# Patient Record
Sex: FEMALE | Race: WHITE | NOT HISPANIC OR LATINO
[De-identification: names, ages, dates, MRNs, and addresses within clinical notes are randomized per-mention and may not be internally consistent; named-entity substitution may affect disease eponyms.]

---

## 2022-07-21 ENCOUNTER — APPOINTMENT (OUTPATIENT)
Dept: UROGYNECOLOGY | Facility: CLINIC | Age: 72
End: 2022-07-21

## 2022-07-21 VITALS — WEIGHT: 184 LBS | BODY MASS INDEX: 33.86 KG/M2 | HEIGHT: 62 IN

## 2022-07-21 DIAGNOSIS — N90.89 OTHER SPECIFIED NONINFLAMMATORY DISORDERS OF VULVA AND PERINEUM: ICD-10-CM

## 2022-07-21 PROCEDURE — 99204 OFFICE O/P NEW MOD 45 MIN: CPT

## 2022-07-21 NOTE — DISCUSSION/SUMMARY
[FreeTextEntry1] : - We reviewed management options for her prolapse and TONE/ISD including: observation, pelvic floor exercises with and without PT, refitting with incontinence pessary, and surgical management. We reviewed various surgical management options including vaginal, laparoscopic/robotic, abdominal procedures. We reviewed procedures involving hysterectomy as well as uterine sparing procedures. We also reviewed both mesh and non-mesh options. Written IUGA information on prolapse treatment options was also provided to her to review. We had an extensive conversation about mesh grafts and r/b. We discussed native tissue repairs. She is unsure how she would like to proceed and will call my office once she has made treatment decision. If pessary, will RTO for refitting with continence pessary. If surgery, she will need pelvic sonogram and scheduling. \par \par -For  her DO, UUI: Continue Myrbetriq 50 mg daily\par \par -For Vulvar dermititis:\par   -Change brand of incontinence pad\par  -apply barrier ointment to skin with each pad change\par  -f/u with GYN if persists

## 2022-07-21 NOTE — PHYSICAL EXAM
[Chaperone Present] : A chaperone was present in the examining room during all aspects of the physical examination [Labia Majora] : were normal [Labia Minora] : were normal [Normal Appearance] : general appearance was normal [Aa ____] : Aa [unfilled] [Ba ____] : Ba [unfilled] [C ____] : C [unfilled] [GH ____] : GH [unfilled] [PB ____] : PB [unfilled] [TVL ____] : TVL  [unfilled] [Ap ____] : Ap [unfilled] [Bp ____] : Bp [unfilled] [D ____] : D [unfilled] [Erythematous] : erythema [Normal] : no abnormalities [Exam Deferred] : was deferred [FreeTextEntry1] : General: Well appearing. Alert and oriented. No acute distress. \par HEENT: Normocephalic, atraumatic, extraocular muscles appear to be intact. \par Neck: Full range of motion, no obvious lymphadenopathy, deformities, or masses noted.\par Respiratory: Speaking in full sentences comfortably. Normal work of breathing. No cough during visit. \par Musculoskeletal: Active full range of motion in extremities. \par Skin: No obvious rash or skin lesions. \par Neuro: Oriented x3. Speech is fluent, normal rate. \par Psych: Normal mood and affect. \par \par  [Tenderness] : ~T no ~M abdominal tenderness observed [Distended] : not distended [de-identified] : pessary removed during visit, unable to appreciate full extent of prolapse due to recent pessary use

## 2022-07-21 NOTE — HISTORY OF PRESENT ILLNESS
[FreeTextEntry1] : Patient is a 72F here for evaluation of pelvic organ prolapse.\par \par She was last seen by urogynecology in 2014 for uterovaginal prolapse, TONE and ISD.  Initially she desired surgical management and underwent workup with pelvic US and urodynamics but ultimately decided she was not ready to have surgery. Since that time she has seen Dr Carl and Dr Joshi. She reports Dr Carl performed workup with URD and discussed surgical options. She was then seen by Dr Joshi who inserted a ring with support pessary and sent her here for surgical consult. Dr Joshi started her on Myrbetriq 50 mg daily which she reports is improving her frequency and urgency. She continues to have bothersome TONE.\par \par Urodynamics 2014: TONE, ISD\par Urodynamics 2022 with trini-she reports DO and UUI, also believes TONE. Will obtain results. \par \par

## 2022-07-21 NOTE — REASON FOR VISIT
[Questionnaire Received] : Patient questionnaire received [Intake Form Reviewed] : Patient intake form with past medical history, surgical history, family history and social history reviewed today [Pelvic Organ Prolapse] : pelvic organ prolapse [Urinary Incontinence] : urinary incontinence [Pessary] : pessary [Spouse] : spouse

## 2022-08-05 DIAGNOSIS — I51.9 HEART DISEASE, UNSPECIFIED: ICD-10-CM

## 2022-08-05 DIAGNOSIS — Z82.5 FAMILY HISTORY OF ASTHMA AND OTHER CHRONIC LOWER RESPIRATORY DISEASES: ICD-10-CM

## 2022-08-05 DIAGNOSIS — Z86.39 PERSONAL HISTORY OF OTHER ENDOCRINE, NUTRITIONAL AND METABOLIC DISEASE: ICD-10-CM

## 2022-08-05 RX ORDER — MIRABEGRON 50 MG/1
50 TABLET, FILM COATED, EXTENDED RELEASE ORAL
Refills: 0 | Status: ACTIVE | COMMUNITY

## 2022-08-12 ENCOUNTER — APPOINTMENT (OUTPATIENT)
Dept: UROGYNECOLOGY | Facility: CLINIC | Age: 72
End: 2022-08-12

## 2022-08-12 PROCEDURE — 99214 OFFICE O/P EST MOD 30 MIN: CPT

## 2022-08-12 PROCEDURE — A4562: CPT

## 2022-08-16 ENCOUNTER — APPOINTMENT (OUTPATIENT)
Dept: ULTRASOUND IMAGING | Facility: CLINIC | Age: 72
End: 2022-08-16

## 2022-08-16 NOTE — DISCUSSION/SUMMARY
[FreeTextEntry1] : \yossi Corbett has uterovaginal prolapse, TONE and ISD. She strongly desires surgery and wants to proceed with surgical scheduling ASAP. Computer generated images were used to demonstrate her prolapse as well as explain treatment options. We reviewed various surgical management options including vaginal, laparoscopic/robotic, abdominal procedures. We reviewed procedures involving hysterectomy as well as uterine sparing procedures. We also reviewed both mesh and non-mesh options. She is interested in RA-HATTIE, [place], sling. I recommend preop pelvic sonogram, Rx provided. She will also require cardiac clearance. Name of procedure, information on surgical time and anesthesia provided to her to give to cardiology. Knob#6 removed for transvaginal sonogram.  She will followup after her pelvic sonogram to replace her pessary.  \par \par She was fitted with an incontinence pessary. Will RTO for pessary insertion after sonogram performed.

## 2022-08-16 NOTE — HISTORY OF PRESENT ILLNESS
[FreeTextEntry1] : Patient is a 72F here for follow up of uterovaginal prolapse and incontinence and for pessary fitting. She currently has a RS #6, but wishes to be fitted with a pessary that will provide support so she will not have TONE. \par \par She was lpreviously seen by urogynecology in 2014 for uterovaginal prolapse, TONE and ISD.  Initially she desired surgical management and underwent workup with pelvic US and urodynamics but ultimately decided she was not ready to have surgery. Since that time she has seen Dr Carl and Dr Joshi. She reports Dr Carl performed workup with URD and discussed surgical options. She was then seen by Dr Joshi who inserted a ring with support pessary and sent her here for surgical consult. Dr Joshi started her on Myrbetriq 50 mg daily which she reports is improving her frequency and urgency. She continues to have bothersome TONE.\par \par Urodynamics 2014: TONE, ISD\par Urodynamics 2022 with trini-she reports DO and UUI, also believes TONE. Will obtain results. \par \par

## 2022-08-16 NOTE — PROCEDURE
[Good Fit] : fits well [Pessary Inserted] : inserted [Pessary Out] : removed [FreeTextEntry1] : Knob #6, stayed in place with ambulation

## 2022-08-16 NOTE — END OF VISIT
[FreeTextEntry3] : I have fully participated in the care of this patient. I have seeen patient,  reviewed all pertinent clinical information, including history, physical exam, plan and the note and I agree.

## 2022-08-18 ENCOUNTER — APPOINTMENT (OUTPATIENT)
Dept: UROGYNECOLOGY | Facility: CLINIC | Age: 72
End: 2022-08-18

## 2022-08-18 PROCEDURE — 99213 OFFICE O/P EST LOW 20 MIN: CPT

## 2022-08-18 NOTE — DISCUSSION/SUMMARY
[FreeTextEntry1] : #Pelvic organ prolapse \par -Continue with Knob #6 \par -Advised of possibility of increased vaginal discharge, possible minimal vaginal bleed \par -Possible pessary falling out \par -Patient will be going away on vacation and will followup upon her return\par -She will f/u as scheduled 10/12/22 for surgical planning\par

## 2022-08-18 NOTE — HISTORY OF PRESENT ILLNESS
[FreeTextEntry1] : Ms. Senior is a 73y/o female with h/o pelvic organ prolapse and TONE who presented last week for IPE with knob #6.  She left without the pessary for a pelvic sonogram scheduled for surgical planning.  She presents today for pessary placement, reports her pelvic sonogram was done yesterday.   \par

## 2022-08-18 NOTE — PROCEDURE
[Good Fit] : fits well [Pessary Inserted] : inserted [None] : no bleeding [Refit] : refit is not needed [Erosion] : no evidence of erosion [Erythema] : no erythema [Discharge] : no vaginal discharge [Infection] : no evidence of infection [FreeTextEntry1] : Knob #6

## 2022-08-19 ENCOUNTER — APPOINTMENT (OUTPATIENT)
Dept: UROGYNECOLOGY | Facility: CLINIC | Age: 72
End: 2022-08-19

## 2022-08-19 PROCEDURE — 99214 OFFICE O/P EST MOD 30 MIN: CPT

## 2022-08-19 PROCEDURE — A4562: CPT

## 2022-08-23 NOTE — DISCUSSION/SUMMARY
[FreeTextEntry1] : Pelvic prolapse:\par -Refitted with Ring with Knob with support #7.\par -Precautions and instructions reviewed\par -Will follow up in 2 weeks or sooner if needed\par \par Advised pt to call office with any questions or concerns

## 2022-08-23 NOTE — PHYSICAL EXAM
[No Acute Distress] : in no acute distress [Well developed] : well developed [Well Nourished] : ~L well nourished [Oriented x3] : oriented to person, place, and time [Normal Memory] : ~T memory was ~L unimpaired [Normal Mood/Affect] : mood and affect are normal [Warm and Dry] : was warm and dry to touch [Normal Gait] : gait was normal [Vulvar Atrophy] : vulvar atrophy [Labia Majora] : were normal [Normal] : was normal [Atrophy] : atrophy [Uterine Prolapse] : uterine prolapse [No Bleeding] : there was no active vaginal bleeding [Good Hygeine] : demonstrates poor hygeine [Anxiety] : patient is not anxious [Tenderness] : ~T no ~M abdominal tenderness observed [Distended] : not distended [de-identified] : B/L erythema due to fungal rash, pt is being treated

## 2022-08-23 NOTE — HISTORY OF PRESENT ILLNESS
[FreeTextEntry1] : Chelle is a 71 y/o that presents to the office for follow up for pelvic prolapse. She was recently fitted with a Ring with knob #6. She reports that it fell out last night while moving her bowels. She denies any vaginal bleeding. She would like to be fitted with a pessary that helps with her urinary leakage.

## 2022-08-23 NOTE — PROCEDURE
[Good Fit] : fits well [Pessary Inserted] : inserted [None] : no bleeding [Medication Review] : Medicaiton Review: Patient verbalizes understanding of risks and benefits [Bowel Management] : Bowel Management: patient verbalizes understanding of daily dietary fiber intake [Refit] : refit is not needed [Erosion] : no evidence of erosion [Erythema] : no erythema [Discharge] : no vaginal discharge [Infection] : no evidence of infection [FreeTextEntry1] : Pt refitted with ing with Knob with support #7 [de-identified] : Pt is able to empty her bladder with no issues. Pt ambulated. Pessary remained in place with bearing down. [FreeTextEntry3] : Replens [FreeTextEntry8] : Discussed [facility]

## 2022-08-25 ENCOUNTER — NON-APPOINTMENT (OUTPATIENT)
Age: 72
End: 2022-08-25

## 2022-08-31 ENCOUNTER — NON-APPOINTMENT (OUTPATIENT)
Age: 72
End: 2022-08-31

## 2022-08-31 ENCOUNTER — APPOINTMENT (OUTPATIENT)
Dept: UROGYNECOLOGY | Facility: CLINIC | Age: 72
End: 2022-08-31

## 2022-08-31 VITALS — TEMPERATURE: 97.1 F | DIASTOLIC BLOOD PRESSURE: 64 MMHG | SYSTOLIC BLOOD PRESSURE: 140 MMHG

## 2022-08-31 DIAGNOSIS — N36.42 INTRINSIC SPHINCTER DEFICIENCY (ISD): ICD-10-CM

## 2022-08-31 DIAGNOSIS — N39.3 STRESS INCONTINENCE (FEMALE) (MALE): ICD-10-CM

## 2022-08-31 DIAGNOSIS — N39.46 MIXED INCONTINENCE: ICD-10-CM

## 2022-08-31 DIAGNOSIS — N36.41 HYPERMOBILITY OF URETHRA: ICD-10-CM

## 2022-08-31 PROCEDURE — 51701 INSERT BLADDER CATHETER: CPT

## 2022-08-31 PROCEDURE — 99214 OFFICE O/P EST MOD 30 MIN: CPT | Mod: 25

## 2022-08-31 NOTE — REASON FOR VISIT
[Follow-Up Visit_____] : a follow-up visit for [unfilled] [Pelvic Organ Prolapse] : pelvic organ prolapse [Urinary Incontinence] : urinary incontinence [Spouse] : spouse

## 2022-09-01 ENCOUNTER — OUTPATIENT (OUTPATIENT)
Dept: OUTPATIENT SERVICES | Facility: HOSPITAL | Age: 72
LOS: 1 days | End: 2022-09-01
Payer: MEDICARE

## 2022-09-01 VITALS
SYSTOLIC BLOOD PRESSURE: 144 MMHG | TEMPERATURE: 98 F | WEIGHT: 164.91 LBS | RESPIRATION RATE: 20 BRPM | HEIGHT: 62 IN | HEART RATE: 89 BPM | OXYGEN SATURATION: 97 % | DIASTOLIC BLOOD PRESSURE: 68 MMHG

## 2022-09-01 DIAGNOSIS — E11.9 TYPE 2 DIABETES MELLITUS WITHOUT COMPLICATIONS: ICD-10-CM

## 2022-09-01 DIAGNOSIS — N81.4 UTEROVAGINAL PROLAPSE, UNSPECIFIED: ICD-10-CM

## 2022-09-01 DIAGNOSIS — I25.10 ATHEROSCLEROTIC HEART DISEASE OF NATIVE CORONARY ARTERY WITHOUT ANGINA PECTORIS: ICD-10-CM

## 2022-09-01 DIAGNOSIS — Z98.49 CATARACT EXTRACTION STATUS, UNSPECIFIED EYE: Chronic | ICD-10-CM

## 2022-09-01 DIAGNOSIS — Z01.818 ENCOUNTER FOR OTHER PREPROCEDURAL EXAMINATION: ICD-10-CM

## 2022-09-01 DIAGNOSIS — Z98.890 OTHER SPECIFIED POSTPROCEDURAL STATES: Chronic | ICD-10-CM

## 2022-09-01 DIAGNOSIS — N36.42 INTRINSIC SPHINCTER DEFICIENCY (ISD): ICD-10-CM

## 2022-09-01 DIAGNOSIS — Z98.61 CORONARY ANGIOPLASTY STATUS: Chronic | ICD-10-CM

## 2022-09-01 DIAGNOSIS — Z29.9 ENCOUNTER FOR PROPHYLACTIC MEASURES, UNSPECIFIED: ICD-10-CM

## 2022-09-01 DIAGNOSIS — N81.2 INCOMPLETE UTEROVAGINAL PROLAPSE: ICD-10-CM

## 2022-09-01 DIAGNOSIS — N39.3 STRESS INCONTINENCE (FEMALE) (MALE): ICD-10-CM

## 2022-09-01 LAB
A1C WITH ESTIMATED AVERAGE GLUCOSE RESULT: 7.1 % — HIGH (ref 4–5.6)
ANION GAP SERPL CALC-SCNC: 12 MMOL/L — SIGNIFICANT CHANGE UP (ref 5–17)
BLD GP AB SCN SERPL QL: NEGATIVE — SIGNIFICANT CHANGE UP
BUN SERPL-MCNC: 20 MG/DL — SIGNIFICANT CHANGE UP (ref 7–23)
CALCIUM SERPL-MCNC: 9.8 MG/DL — SIGNIFICANT CHANGE UP (ref 8.4–10.5)
CHLORIDE SERPL-SCNC: 106 MMOL/L — SIGNIFICANT CHANGE UP (ref 96–108)
CO2 SERPL-SCNC: 21 MMOL/L — LOW (ref 22–31)
CREAT SERPL-MCNC: 0.92 MG/DL — SIGNIFICANT CHANGE UP (ref 0.5–1.3)
EGFR: 66 ML/MIN/1.73M2 — SIGNIFICANT CHANGE UP
ESTIMATED AVERAGE GLUCOSE: 157 MG/DL — HIGH (ref 68–114)
GLUCOSE SERPL-MCNC: 183 MG/DL — HIGH (ref 70–99)
HCT VFR BLD CALC: 37 % — SIGNIFICANT CHANGE UP (ref 34.5–45)
HGB BLD-MCNC: 11.1 G/DL — LOW (ref 11.5–15.5)
MCHC RBC-ENTMCNC: 25.4 PG — LOW (ref 27–34)
MCHC RBC-ENTMCNC: 30 GM/DL — LOW (ref 32–36)
MCV RBC AUTO: 84.7 FL — SIGNIFICANT CHANGE UP (ref 80–100)
NRBC # BLD: 0 /100 WBCS — SIGNIFICANT CHANGE UP (ref 0–0)
PLATELET # BLD AUTO: 221 K/UL — SIGNIFICANT CHANGE UP (ref 150–400)
POTASSIUM SERPL-MCNC: 4.9 MMOL/L — SIGNIFICANT CHANGE UP (ref 3.5–5.3)
POTASSIUM SERPL-SCNC: 4.9 MMOL/L — SIGNIFICANT CHANGE UP (ref 3.5–5.3)
RBC # BLD: 4.37 M/UL — SIGNIFICANT CHANGE UP (ref 3.8–5.2)
RBC # FLD: 16.9 % — HIGH (ref 10.3–14.5)
RH IG SCN BLD-IMP: POSITIVE — SIGNIFICANT CHANGE UP
SODIUM SERPL-SCNC: 139 MMOL/L — SIGNIFICANT CHANGE UP (ref 135–145)
WBC # BLD: 7.12 K/UL — SIGNIFICANT CHANGE UP (ref 3.8–10.5)
WBC # FLD AUTO: 7.12 K/UL — SIGNIFICANT CHANGE UP (ref 3.8–10.5)

## 2022-09-01 PROCEDURE — 83036 HEMOGLOBIN GLYCOSYLATED A1C: CPT

## 2022-09-01 PROCEDURE — 86850 RBC ANTIBODY SCREEN: CPT

## 2022-09-01 PROCEDURE — 36415 COLL VENOUS BLD VENIPUNCTURE: CPT

## 2022-09-01 PROCEDURE — 86900 BLOOD TYPING SEROLOGIC ABO: CPT

## 2022-09-01 PROCEDURE — 80048 BASIC METABOLIC PNL TOTAL CA: CPT

## 2022-09-01 PROCEDURE — G0463: CPT

## 2022-09-01 PROCEDURE — 86901 BLOOD TYPING SEROLOGIC RH(D): CPT

## 2022-09-01 PROCEDURE — 85027 COMPLETE CBC AUTOMATED: CPT

## 2022-09-01 NOTE — H&P PST ADULT - NSICDXPASTSURGICALHX_GEN_ALL_CORE_FT
PAST SURGICAL HISTORY:  H/O cataract extraction     H/O colonoscopy     History of arthroplasty of left hip for osteoarthritis     PAST SURGICAL HISTORY:  H/O breast biopsy 2002   no marker    H/O cataract extraction     H/O colonoscopy     History of arthroplasty of left hip for osteoarthritis  1/2022    S/P PTCA (percutaneous transluminal coronary angioplasty) 2016  coronary artery stenting

## 2022-09-01 NOTE — PHYSICAL EXAM
[Labia Majora] : were normal [Labia Minora] : were normal [Normal Appearance] : general appearance was normal [Atrophy] : atrophy [Exam Deferred] : was deferred [Normal] : normal [FreeTextEntry1] : General: Well, appearing. Alert and orientated. No acute distress\par HEENT: Normocephalic, atraumatic and extraocular muscles appear to be intact \par Neck: Full range of motion, no obvious lymphadenopathy, deformities, or masses noted \par Respiratory: Speaking in full sentences comfortably, normal work of breathing and no cough during visit\par Musculoskeletal: active full range of motion in extremities \par Extremities: No upper extremity edema noted\par Skin: no obvious rash or skin lesions\par Neuro: Orientated X 3, speech is fluent, normal rate\par Psych: Normal mood and affect\par  [de-identified] : unable to fully evaluate due to pessary use

## 2022-09-01 NOTE — H&P PST ADULT - HISTORY OF PRESENT ILLNESS
72 year old female with h/o CAD s/p PTCA with coronary artery stenting (2016, on xarelto and aspirin), HTN, dyslipidemia, T2DM, osteoarthritis s/p left THR (1/2022), presents for preop testing for scheduled laparoscopic robotic supracervical hysterectomy/ laparoscopic robotic sacral colpopexy/ midurethral sling/ cystoscopy for uterovaginal prolapse/ intrinsic sphincter deficiency on 9/22/2022. Pt denies any recent urinary infection, urine sample collected at surgeon's office on 8/31/2022 for culture.  Pt was evaluated by her cardiologist and advised to hold Xarelto for 5 days prior to surgery, her last dose on 9/16/2022 evening, will continue her aspirin.   Pt denies any symptoms of covid-19 and scheduled for PCR test on 9/19/2022 at [facility]. 72 year old female with h/o CAD s/p PTCA with coronary artery stenting (2016, on xarelto and aspirin), HTN, dyslipidemia, T2DM, osteoarthritis s/p left THR (1/2022), presents for preop testing for scheduled laparoscopic robotic supracervical hysterectomy/ laparoscopic robotic sacral colpopexy/ midurethral sling/ cystoscopy for uterovaginal prolapse/ intrinsic sphincter deficiency on 9/22/2022. Pt denies any recent urinary infection, urine sample collected at surgeon's office on 8/31/2022 for culture.  Pt was evaluated by her cardiologist and advised to hold Xarelto for 5 days prior to surgery, her last dose on 9/16/2022 evening, will continue her aspirin.   Pt denies any symptoms of covid-19 and scheduled for PCR test on 9/19/2022 at [facility].    Addendum: 9/20/22 1105am Pt placed on oral Ceftin to treat a current UTI.  72 year old female with h/o CAD s/p PTCA with coronary artery stenting (2016, on xarelto and aspirin), HTN, dyslipidemia, T2DM, osteoarthritis s/p left THR (1/2022), presents for preop testing for scheduled laparoscopic robotic supracervical hysterectomy/ laparoscopic robotic sacral colpopexy/ midurethral sling/ cystoscopy for uterovaginal prolapse/ intrinsic sphincter deficiency on 9/22/2022. Pt denies any recent urinary infection, urine sample collected at surgeon's office on 8/31/2022 for culture.  Pt was evaluated by her cardiologist and advised to hold Xarelto for 5 days prior to surgery, her last dose on 9/16/2022 evening, will continue her aspirin.   Pt denies any symptoms of covid-19 and scheduled for PCR test on 9/19/2022 at [facility].    Addendum: 9/20/22 1105am Pt placed on oral Ceftin to treat a current UTI, as per PCP.

## 2022-09-01 NOTE — HISTORY OF PRESENT ILLNESS
[FreeTextEntry1] : Chelle is a 71 yo who presents, with her  today, for follow up on prolapse and urinary incontinence. She has a hx of CAD and prior stent placement and is on Xarelto 2.5mg BID for this. \par \par Pelvic U/S 8/16/2022: uterus: 6.3 cm x 3.8 cm x 5.5 cm. multiple fibroids largest measuring 2.4 cm x 1.9cm intramural fibroid. EMS 4mm. R ovary: 2.5 cm x 1.4 cm. L ovary: 2.4 cm x 1.9cm. \par Pap 09/2021: negative cotesting \par UDS 08/2022: +-369mL. +DO (but not sure what volume) PVR 0mL. \par Cardiology clearance obtained: pt to stop Xarelto 5 days prior to surgery and to continue ASA 81mg. \par Medical clearance scheduled. \par \par PMHx: CAD, DM (A1c: 6.6% in July, per pt) \par PSHx: cardiac stent placement/catheter ablation\par \par \par

## 2022-09-01 NOTE — H&P PST ADULT - ASSESSMENT
CAPRINI SCORE [CLOT updated 18]    AGE RELATED RISK FACTORS                                                       MOBILITY RELATED FACTORS  [ ] Age 41-60 years                                            (1 Point)                    [ ] Bed rest                                                        (1 Point)  [2 ] Age: 61-74 years                                           (2 Points)                  [ ] Plaster cast                                                   (2 Points)  [ ] Age= 75 years                                              (3 Points)                    [ ] Bed bound for more than 72 hours                 (2 Points)    DISEASE RELATED RISK FACTORS                                               GENDER SPECIFIC FACTORS  [ ] Edema in the lower extremities                       (1 Point)              [ ] Pregnancy                                                     (1 Point)  [ ] Varicose veins                                               (1 Point)                     [ ] Post-partum < 6 weeks                                   (1 Point)             [ 1] BMI > 25 Kg/m2                                            (1 Point)                     [ ] Hormonal therapy  or oral contraception          (1 Point)                 [ ] Sepsis (in the previous month)                        (1 Point)               [ ] History of pregnancy complications                 (1 point)  [ ] Pneumonia or serious lung disease                                               [ ] Unexplained or recurrent                     (1 Point)           (in the previous month)                               (1 Point)  [ ] Abnormal pulmonary function test                     (1 Point)                 SURGERY RELATED RISK FACTORS  [ ] Acute myocardial infarction                              (1 Point)               [ ]  Section                                             (1 Point)  [ ] Congestive heart failure (in the previous month)  (1 Point)      [ ] Minor surgery                                                  (1 Point)   [ ] Inflammatory bowel disease                             (1 Point)               [ ] Arthroscopic surgery                                        (2 Points)  [ ] Central venous access                                      (2 Points)                [2 ] General surgery lasting more than 45 minutes (2 points)  [ ] Present or previous malignancy                     (2 Points)                [ ] Elective arthroplasty                                         (5 points)    [ ] Stroke (in the previous month)                          (5 Points)                                                                                                                                                           HEMATOLOGY RELATED FACTORS                                                 TRAUMA RELATED RISK FACTORS  [ ] Prior episodes of VTE                                     (3 Points)                [ ] Fracture of the hip, pelvis, or leg                       (5 Points)  [ ] Positive family history for VTE                         (3 Points)             [ ] Acute spinal cord injury (in the previous month)  (5 Points)  [ ] Prothrombin 13206 A                                     (3 Points)               [ ] Paralysis  (less than 1 month)                             (5 Points)  [ ] Factor V Leiden                                             (3 Points)                  [ ] Multiple Trauma within 1 month                        (5 Points)  [ ] Lupus anticoagulants                                     (3 Points)                                                           [ ] Anticardiolipin antibodies                               (3 Points)                                                       [ ] High homocysteine in the blood                      (3 Points)                                             [ ] Other congenital or acquired thrombophilia      (3 Points)                                                [ ] Heparin induced thrombocytopenia                  (3 Points)                                     Total Score [5]

## 2022-09-01 NOTE — H&P PST ADULT - PROBLEM SELECTOR PLAN 1
Scheduled for laparoscopic robotic supracervical hysterectomy/ laparoscopic robotic sacral colopexy/ midurethral sling/ cystoscopy  both pt and her  provided with verbal and written preop instruction, pt verbalized understand and teach back     urine sample collected for culture at Dr. Valentine's office on 8/31/2022, result pending

## 2022-09-01 NOTE — H&P PST ADULT - OTHER CARE PROVIDERS
Cardiologist: Dr. James Victoria # [phone]           Endocrinologist: Dr. Andreia Durán # [phone]

## 2022-09-01 NOTE — H&P PST ADULT - NSICDXPASTMEDICALHX_GEN_ALL_CORE_FT
PAST MEDICAL HISTORY:  CAD (coronary artery disease)     HTN (hypertension)     T2DM (type 2 diabetes mellitus) last A1c 6.6     PAST MEDICAL HISTORY:  CAD (coronary artery disease)     History of osteoarthritis     HTN (hypertension)     Incomplete uterovaginal prolapse     T2DM (type 2 diabetes mellitus) last A1c 6.6     PAST MEDICAL HISTORY:  CAD (coronary artery disease)     History of osteoarthritis     HTN (hypertension)     Incomplete uterovaginal prolapse     Right Achilles tendinitis     T2DM (type 2 diabetes mellitus) last A1c 6.6

## 2022-09-01 NOTE — H&P PST ADULT - PROBLEM SELECTOR PLAN 2
pt to hold xarelto for 5 days prior to surgery per  her cardiologist, last dose on 9/16/2022 night   cardiologist evaluation, echo and ekg to obtain

## 2022-09-01 NOTE — H&P PST ADULT - NS MD HP INPLANTS MED DEV
left artificial hip joint, coronary artery sten left artificial hip joint, coronary artery stent and toot implant

## 2022-09-01 NOTE — PROCEDURE
Received records from previous provider.    Past medical history significant for hidradenitis suppurativa, HPV in 2014 with negative follow-up Pap in 2015, keloid of skin. Past surgical history significant for swelling gland excision of the axilla in 2010. Family history significant for diabetes mellitus. Allergy to shellfish noted.    BMP dated 7/27/17 notable for glucose of 150. Cholesterol panel dated 8/14/17 notable for total cholesterol of 200. HDL of 37. LDL of 134.     Hemoglobin A1c dated 8/14/17 was 8.4.     At 8/22/17 visit, discussion regarding diet modification for recently diagnosed diabetes was held. Patient was started on metformin 500 mg b.i.d. with meals and recommended to check blood glucose daily.    Cuauhtemoc Moreno DO     [Straight Catheterization] : insertion of a straight catheter [Urinary Tract Infection] : a urinary tract infection [Patient] : the patient [Consent Obtained] : written consent was obtained prior to the procedure and is detailed in the patient's record [___ Fr Straight Tip] : a [unfilled] in [place] straight tip catheter [None] : there were no complications with the catheter insertion [Clear] : clear [Culture] : culture [No Complications] : no complications [Tolerated Well] : the patient tolerated the procedure well [Good Fit] : fits well [Pessary Inserted] : inserted [Pessary Washed] : washed [Refit] : refit is not needed [Erosion] : no evidence of erosion [Erythema] : no erythema [Discharge] : no vaginal discharge [Infection] : no evidence of infection [FreeTextEntry1] : Ring with knob #6.  [FreeTextEntry8] : Pessary precautions and instructions discussed. Discussed removal 1 week prior to surgery on 9/15/2022.

## 2022-09-01 NOTE — DISCUSSION/SUMMARY
[FreeTextEntry1] : Chelle is a 71 yo who presents with her  today for follow up on prolapse and urinary incontinence. We reviewed management options for her prolapse including: observation, pelvic floor exercises, pessary, surgical management. We reviewed various surgical management options including vaginal, laparoscopic/robotic, abdominal procedures. We also reviewed both mesh and non-mesh options. She continues to be interested in robotic supracervical hysterectomy with sacral colpopexy. We discussed her URD findings of TONE and reviewed treatment options. She desires a midurethral sling at the time of her prolapse repair. \par \par She denies a history of abnormal Pap smears in the past and a recent Pap smear is negative. Risks and benefits of total hysterectomy versus supracervical hysterectomy were discussed with patient and she opted for supracervical hysterectomy. She is aware that her cervix will be left in situ and she will require continued routine screening. We discussed the risks and benefits of removing her ovaries at the time of surgery and she would like to keep her ovaries in situ. she would like to proceed with a bilateral salpingectomy at the time of the procedure. we reviewed risks of the surgery including but not limited to: bleeding, infection, pain, urinary retention, persistence or worsening of stress urinary incontinence, development of urge incontinence, voiding dysfunction, mesh erosion, failure to reduce prolapse, prolapse recurrence, and dyspareunia. we discussed the risk of laparotomy (having to make an incision). We discussed the risk of injury to her bladder, ureters, urethra, vagina, rectum and bowel. We discussed the risk of mesh erosion, fistula formation and neuropathy. The risks and procedure details were explained in layman's terms and she expressed understanding of all of these risks. She understands that mesh will be used and we reviewed the FDA notification on transvaginal mesh. A separate mesh specific consent form with procedure risks and mesh risks was reviewed and initialed by myself and the patient. This was witnessed and signed. \par \par We discussed the elective nature of the surgery and we discussed that she is choosing to undergo this surgery despite awareness and understanding of procedure risks. She was counseled on alternative treatment options. She continues to desire procedure. We reviewed her hospital stay as well as preoperative and postoperative instructions.\par \par Patient signed consent for: pelvic exam under anesthesia, robotic/laparoscopic-assisted supracervical hysterectomy, bilateral salpingectomy, sacrocolpopexy, midurethral sling, cystoscopy, possible laparotomy/oophorectomy/anterior repair/posterior repair. \par \par Pt signed consent for clinical effectiveness of pre-incision versus post-incision local anesthetic during laparoscopic/robotic sacrocolpopexy randomized clinical trial. Risks and benefits of the study of enrolling were discussed and patient elected to proceed with the study. Informational pamphlet was provided to the patient and consent was signed in the office. She was given a copy of the signed consent form along with the narcotic diary and brief pain inventory questionnaire. She was informed that being apart of the research study is completely elective and she can withdraw from the study at any time. All questions answered.\par \par \par Patient will return on 9/15/2022 for pessary removal 1 week prior to surgery. \par

## 2022-09-01 NOTE — H&P PST ADULT - PROBLEM SELECTOR PLAN 3
fs on admit   pt to hold Janumet x 24 hours prior to surgery, last dose on 921/2022 morning  no diabetic medication on DOS

## 2022-09-01 NOTE — H&P PST ADULT - BMI (KG/M2)
Physical therapy orders received, chart reviewed. Patient scheduled for hip ORIF later this date. Will hold evaluation and await new orders following surgery.   30.2

## 2022-09-01 NOTE — H&P PST ADULT - PAIN DESCRIPTION (FREQUENCY/QUALITY), PROFILE
Kriss from Lab called with critical result of WBC 54 at 2125. Critical lab result read back to Kriss.   Dr. Mahajan notified of critical lab result at 2130.  Critical lab result read back by Dr. Mahajan.   intermittent

## 2022-09-07 PROBLEM — E11.9 TYPE 2 DIABETES MELLITUS WITHOUT COMPLICATIONS: Chronic | Status: ACTIVE | Noted: 2022-09-01

## 2022-09-07 PROBLEM — M76.61 ACHILLES TENDINITIS, RIGHT LEG: Chronic | Status: ACTIVE | Noted: 2022-09-01

## 2022-09-07 PROBLEM — N81.2 INCOMPLETE UTEROVAGINAL PROLAPSE: Chronic | Status: ACTIVE | Noted: 2022-09-01

## 2022-09-07 PROBLEM — Z87.39 PERSONAL HISTORY OF OTHER DISEASES OF THE MUSCULOSKELETAL SYSTEM AND CONNECTIVE TISSUE: Chronic | Status: ACTIVE | Noted: 2022-09-01

## 2022-09-07 PROBLEM — I10 ESSENTIAL (PRIMARY) HYPERTENSION: Chronic | Status: ACTIVE | Noted: 2022-09-01

## 2022-09-07 PROBLEM — I25.10 ATHEROSCLEROTIC HEART DISEASE OF NATIVE CORONARY ARTERY WITHOUT ANGINA PECTORIS: Chronic | Status: ACTIVE | Noted: 2022-09-01

## 2022-09-08 ENCOUNTER — APPOINTMENT (OUTPATIENT)
Dept: UROGYNECOLOGY | Facility: CLINIC | Age: 72
End: 2022-09-08

## 2022-09-08 PROCEDURE — 99213 OFFICE O/P EST LOW 20 MIN: CPT

## 2022-09-08 NOTE — HISTORY OF PRESENT ILLNESS
[FreeTextEntry1] : Chelle is a 73 yo who presents, with her  today, for follow up on prolapse. Prolapse is supported with Ring with knob #7. She denies any vaginal bleeding, pelvic pain or pressure. She reports occasional urinary leakage. She is having surgery with Dr. Valentine on 9/22. She is having pessary removed on 9/15.\par \par

## 2022-09-08 NOTE — PHYSICAL EXAM
[FreeTextEntry1] : General: Well, appearing. Alert and orientated. No acute distress\par HEENT: Normocephalic, atraumatic and extraocular muscles appear to be intact \par Neck: Full range of motion, no obvious lymphadenopathy, deformities, or masses noted \par Respiratory: Speaking in full sentences comfortably, normal work of breathing and no cough during visit\par Musculoskeletal: active full range of motion in extremities \par Extremities: No upper extremity edema noted\par Skin: no obvious rash or skin lesions\par Neuro: Orientated X 3, speech is fluent, normal rate\par Psych: Normal mood and affect\par  [No Acute Distress] : in no acute distress [Well developed] : well developed [Well Nourished] : ~L well nourished [Good Hygeine] : demonstrates good hygeine [Oriented x3] : oriented to person, place, and time [Normal Memory] : ~T memory was ~L unimpaired [Normal Mood/Affect] : mood and affect are normal [Anxiety] : patient is not anxious [Tenderness] : ~T no ~M abdominal tenderness observed [Distended] : not distended [Warm and Dry] : was warm and dry to touch [Normal Gait] : gait was normal [Vulvar Atrophy] : vulvar atrophy [Labia Majora] : were normal [Labia Minora] : were normal [Normal] : was normal [Normal Appearance] : general appearance was normal [Atrophy] : atrophy

## 2022-09-08 NOTE — REASON FOR VISIT
[Follow-Up Visit_____] : a follow-up visit for [unfilled] [Spouse] : spouse [Pelvic Organ Prolapse] : pelvic organ prolapse [Urinary Incontinence] : urinary incontinence

## 2022-09-08 NOTE — DISCUSSION/SUMMARY
[FreeTextEntry1] : Pelvic prolapse:\par Continue with Ring with knob #7\par Reviewed pessary precautions and instructions\par Will RTO 9/15 to remove pessary prior to surgery on 9/22\par \par Advised pt to call the office with any questions or concerns.

## 2022-09-08 NOTE — PROCEDURE
[Good Fit] : fits well [Refit] : refit is not needed [Erosion] : no evidence of erosion [Erythema] : no erythema [Discharge] : no vaginal discharge [Infection] : no evidence of infection [Pessary Inserted] : inserted [Pessary Washed] : washed [None] : no bleeding [FreeTextEntry1] : Ring with knob #7

## 2022-09-15 ENCOUNTER — APPOINTMENT (OUTPATIENT)
Dept: UROGYNECOLOGY | Facility: CLINIC | Age: 72
End: 2022-09-15

## 2022-09-15 VITALS — DIASTOLIC BLOOD PRESSURE: 60 MMHG | TEMPERATURE: 97.3 F | SYSTOLIC BLOOD PRESSURE: 120 MMHG

## 2022-09-15 DIAGNOSIS — N81.4 UTEROVAGINAL PROLAPSE, UNSPECIFIED: ICD-10-CM

## 2022-09-15 PROCEDURE — 99212 OFFICE O/P EST SF 10 MIN: CPT

## 2022-09-16 ENCOUNTER — NON-APPOINTMENT (OUTPATIENT)
Age: 72
End: 2022-09-16

## 2022-09-19 ENCOUNTER — NON-APPOINTMENT (OUTPATIENT)
Age: 72
End: 2022-09-19

## 2022-09-19 ENCOUNTER — OUTPATIENT (OUTPATIENT)
Dept: OUTPATIENT SERVICES | Facility: HOSPITAL | Age: 72
LOS: 1 days | End: 2022-09-19

## 2022-09-19 DIAGNOSIS — Z11.52 ENCOUNTER FOR SCREENING FOR COVID-19: ICD-10-CM

## 2022-09-19 DIAGNOSIS — Z98.890 OTHER SPECIFIED POSTPROCEDURAL STATES: Chronic | ICD-10-CM

## 2022-09-19 DIAGNOSIS — Z98.49 CATARACT EXTRACTION STATUS, UNSPECIFIED EYE: Chronic | ICD-10-CM

## 2022-09-19 DIAGNOSIS — Z98.61 CORONARY ANGIOPLASTY STATUS: Chronic | ICD-10-CM

## 2022-09-19 LAB — SARS-COV-2 RNA SPEC QL NAA+PROBE: SIGNIFICANT CHANGE UP

## 2022-09-21 ENCOUNTER — TRANSCRIPTION ENCOUNTER (OUTPATIENT)
Age: 72
End: 2022-09-21

## 2022-09-22 ENCOUNTER — RESULT REVIEW (OUTPATIENT)
Age: 72
End: 2022-09-22

## 2022-09-22 ENCOUNTER — TRANSCRIPTION ENCOUNTER (OUTPATIENT)
Age: 72
End: 2022-09-22

## 2022-09-22 ENCOUNTER — APPOINTMENT (OUTPATIENT)
Dept: UROGYNECOLOGY | Facility: HOSPITAL | Age: 72
End: 2022-09-22

## 2022-09-22 ENCOUNTER — INPATIENT (INPATIENT)
Facility: HOSPITAL | Age: 72
LOS: 0 days | Discharge: HOME | DRG: 743 | End: 2022-09-23
Attending: STUDENT IN AN ORGANIZED HEALTH CARE EDUCATION/TRAINING PROGRAM | Admitting: STUDENT IN AN ORGANIZED HEALTH CARE EDUCATION/TRAINING PROGRAM
Payer: COMMERCIAL

## 2022-09-22 VITALS
OXYGEN SATURATION: 99 % | DIASTOLIC BLOOD PRESSURE: 75 MMHG | SYSTOLIC BLOOD PRESSURE: 136 MMHG | TEMPERATURE: 98 F | RESPIRATION RATE: 20 BRPM | HEART RATE: 76 BPM | WEIGHT: 164.91 LBS | HEIGHT: 62 IN

## 2022-09-22 DIAGNOSIS — Z98.890 OTHER SPECIFIED POSTPROCEDURAL STATES: Chronic | ICD-10-CM

## 2022-09-22 DIAGNOSIS — Z98.61 CORONARY ANGIOPLASTY STATUS: Chronic | ICD-10-CM

## 2022-09-22 DIAGNOSIS — N39.3 STRESS INCONTINENCE (FEMALE) (MALE): ICD-10-CM

## 2022-09-22 DIAGNOSIS — Z98.49 CATARACT EXTRACTION STATUS, UNSPECIFIED EYE: Chronic | ICD-10-CM

## 2022-09-22 LAB
GLUCOSE BLDC GLUCOMTR-MCNC: 220 MG/DL — HIGH (ref 70–99)
GLUCOSE BLDC GLUCOMTR-MCNC: 220 MG/DL — HIGH (ref 70–99)
GLUCOSE BLDC GLUCOMTR-MCNC: 248 MG/DL — HIGH (ref 70–99)
GLUCOSE BLDC GLUCOMTR-MCNC: 258 MG/DL — HIGH (ref 70–99)
HCT VFR BLD CALC: 34.4 % — LOW (ref 34.5–45)
HGB BLD-MCNC: 10.9 G/DL — LOW (ref 11.5–15.5)

## 2022-09-22 PROCEDURE — 88305 TISSUE EXAM BY PATHOLOGIST: CPT | Mod: 26

## 2022-09-22 PROCEDURE — 57425 LAPAROSCOPY SURG COLPOPEXY: CPT

## 2022-09-22 PROCEDURE — 58542 LSH W/T/O UT 250 G OR LESS: CPT

## 2022-09-22 PROCEDURE — 57288 REPAIR BLADDER DEFECT: CPT

## 2022-09-22 DEVICE — SURGIFLO MATRIX WITH THROMBIN KIT
Type: IMPLANTABLE DEVICE | Status: NON-FUNCTIONAL
Removed: 2022-09-22

## 2022-09-22 DEVICE — SLING TRANSVAGINAL MID-URETHRAL ADVANTAGE FIT BLUE
Type: IMPLANTABLE DEVICE | Status: NON-FUNCTIONAL
Removed: 2022-09-22

## 2022-09-22 DEVICE — MESH UPSYLON Y
Type: IMPLANTABLE DEVICE | Status: NON-FUNCTIONAL
Removed: 2022-09-22

## 2022-09-22 RX ORDER — CELECOXIB 200 MG/1
400 CAPSULE ORAL ONCE
Refills: 0 | Status: COMPLETED | OUTPATIENT
Start: 2022-09-22 | End: 2022-09-22

## 2022-09-22 RX ORDER — ACETAMINOPHEN 500 MG
1000 TABLET ORAL EVERY 6 HOURS
Refills: 0 | Status: ACTIVE | OUTPATIENT
Start: 2022-09-22 | End: 2023-08-21

## 2022-09-22 RX ORDER — CHLORHEXIDINE GLUCONATE 213 G/1000ML
1 SOLUTION TOPICAL ONCE
Refills: 0 | Status: DISCONTINUED | OUTPATIENT
Start: 2022-09-22 | End: 2022-09-22

## 2022-09-22 RX ORDER — DEXTROSE 50 % IN WATER 50 %
25 SYRINGE (ML) INTRAVENOUS ONCE
Refills: 0 | Status: ACTIVE | OUTPATIENT
Start: 2022-09-22

## 2022-09-22 RX ORDER — LOSARTAN POTASSIUM 100 MG/1
1 TABLET, FILM COATED ORAL
Qty: 0 | Refills: 0 | DISCHARGE

## 2022-09-22 RX ORDER — SODIUM CHLORIDE 9 MG/ML
1000 INJECTION, SOLUTION INTRAVENOUS
Refills: 0 | Status: ACTIVE | OUTPATIENT
Start: 2022-09-22 | End: 2023-08-21

## 2022-09-22 RX ORDER — SODIUM CHLORIDE 9 MG/ML
1000 INJECTION, SOLUTION INTRAVENOUS
Refills: 0 | Status: DISCONTINUED | OUTPATIENT
Start: 2022-09-22 | End: 2022-09-23

## 2022-09-22 RX ORDER — CARVEDILOL PHOSPHATE 80 MG/1
1 CAPSULE, EXTENDED RELEASE ORAL
Qty: 0 | Refills: 0 | DISCHARGE

## 2022-09-22 RX ORDER — OXYCODONE HYDROCHLORIDE 5 MG/1
5 TABLET ORAL EVERY 4 HOURS
Refills: 0 | Status: ACTIVE | OUTPATIENT
Start: 2022-09-22 | End: 2022-09-29

## 2022-09-22 RX ORDER — DEXTROSE 50 % IN WATER 50 %
15 SYRINGE (ML) INTRAVENOUS ONCE
Refills: 0 | Status: ACTIVE | OUTPATIENT
Start: 2022-09-22 | End: 2023-08-21

## 2022-09-22 RX ORDER — POLYETHYLENE GLYCOL 3350 17 G/17G
17 POWDER, FOR SOLUTION ORAL AT BEDTIME
Refills: 0 | Status: ACTIVE | OUTPATIENT
Start: 2022-09-22 | End: 2023-08-21

## 2022-09-22 RX ORDER — TOBRAMYCIN 0.3 %
1 DROPS OPHTHALMIC (EYE) EVERY 4 HOURS
Refills: 0 | Status: COMPLETED | OUTPATIENT
Start: 2022-09-22 | End: 2022-09-23

## 2022-09-22 RX ORDER — HYDROMORPHONE HYDROCHLORIDE 2 MG/ML
0.25 INJECTION INTRAMUSCULAR; INTRAVENOUS; SUBCUTANEOUS
Refills: 0 | Status: DISCONTINUED | OUTPATIENT
Start: 2022-09-22 | End: 2022-09-22

## 2022-09-22 RX ORDER — ENOXAPARIN SODIUM 100 MG/ML
40 INJECTION SUBCUTANEOUS ONCE
Refills: 0 | Status: COMPLETED | OUTPATIENT
Start: 2022-09-22 | End: 2022-09-22

## 2022-09-22 RX ORDER — ASPIRIN/CALCIUM CARB/MAGNESIUM 324 MG
0 TABLET ORAL
Qty: 0 | Refills: 0 | DISCHARGE

## 2022-09-22 RX ORDER — ATORVASTATIN CALCIUM 80 MG/1
1 TABLET, FILM COATED ORAL
Qty: 0 | Refills: 0 | DISCHARGE

## 2022-09-22 RX ORDER — SITAGLIPTIN AND METFORMIN HYDROCHLORIDE 500; 50 MG/1; MG/1
1 TABLET, FILM COATED ORAL
Qty: 0 | Refills: 0 | DISCHARGE

## 2022-09-22 RX ORDER — SIMETHICONE 80 MG/1
80 TABLET, CHEWABLE ORAL EVERY 6 HOURS
Refills: 0 | Status: ACTIVE | OUTPATIENT
Start: 2022-09-22 | End: 2023-08-21

## 2022-09-22 RX ORDER — LIDOCAINE HCL 20 MG/ML
0.2 VIAL (ML) INJECTION ONCE
Refills: 0 | Status: DISCONTINUED | OUTPATIENT
Start: 2022-09-22 | End: 2022-09-22

## 2022-09-22 RX ORDER — DEXTROSE 50 % IN WATER 50 %
12.5 SYRINGE (ML) INTRAVENOUS ONCE
Refills: 0 | Status: ACTIVE | OUTPATIENT
Start: 2022-09-22

## 2022-09-22 RX ORDER — GABAPENTIN 400 MG/1
300 CAPSULE ORAL EVERY 8 HOURS
Refills: 0 | Status: ACTIVE | OUTPATIENT
Start: 2022-09-22 | End: 2022-09-25

## 2022-09-22 RX ORDER — ONDANSETRON 8 MG/1
8 TABLET, FILM COATED ORAL EVERY 8 HOURS
Refills: 0 | Status: COMPLETED | OUTPATIENT
Start: 2022-09-22 | End: 2022-09-23

## 2022-09-22 RX ORDER — OXYCODONE HYDROCHLORIDE 5 MG/1
1 TABLET ORAL
Qty: 5 | Refills: 0
Start: 2022-09-22 | End: 2022-09-23

## 2022-09-22 RX ORDER — INSULIN LISPRO 100/ML
VIAL (ML) SUBCUTANEOUS AT BEDTIME
Refills: 0 | Status: ACTIVE | OUTPATIENT
Start: 2022-09-22 | End: 2023-08-21

## 2022-09-22 RX ORDER — MIRABEGRON 50 MG/1
1 TABLET, EXTENDED RELEASE ORAL
Qty: 0 | Refills: 0 | DISCHARGE

## 2022-09-22 RX ORDER — GENTAMICIN SULFATE 40 MG/ML
370 VIAL (ML) INJECTION ONCE
Refills: 0 | Status: ACTIVE | OUTPATIENT
Start: 2022-09-22 | End: 2022-09-22

## 2022-09-22 RX ORDER — GLIMEPIRIDE 1 MG
1 TABLET ORAL
Qty: 0 | Refills: 0 | DISCHARGE

## 2022-09-22 RX ORDER — GABAPENTIN 400 MG/1
600 CAPSULE ORAL ONCE
Refills: 0 | Status: COMPLETED | OUTPATIENT
Start: 2022-09-22 | End: 2022-09-22

## 2022-09-22 RX ORDER — SODIUM CHLORIDE 9 MG/ML
3 INJECTION INTRAMUSCULAR; INTRAVENOUS; SUBCUTANEOUS EVERY 8 HOURS
Refills: 0 | Status: DISCONTINUED | OUTPATIENT
Start: 2022-09-22 | End: 2022-09-22

## 2022-09-22 RX ORDER — ACETAMINOPHEN 500 MG
1000 TABLET ORAL ONCE
Refills: 0 | Status: COMPLETED | OUTPATIENT
Start: 2022-09-22 | End: 2022-09-22

## 2022-09-22 RX ORDER — RIVAROXABAN 15 MG-20MG
1 KIT ORAL
Qty: 0 | Refills: 0 | DISCHARGE

## 2022-09-22 RX ORDER — TOBRAMYCIN 0.3 %
DROPS OPHTHALMIC (EYE)
Refills: 0 | Status: COMPLETED | OUTPATIENT
Start: 2022-09-22 | End: 2022-09-23

## 2022-09-22 RX ORDER — METRONIDAZOLE 500 MG
500 TABLET ORAL ONCE
Refills: 0 | Status: ACTIVE | OUTPATIENT
Start: 2022-09-22 | End: 2022-09-22

## 2022-09-22 RX ORDER — KETOROLAC TROMETHAMINE 30 MG/ML
15 SYRINGE (ML) INJECTION EVERY 8 HOURS
Refills: 0 | Status: DISCONTINUED | OUTPATIENT
Start: 2022-09-22 | End: 2022-09-23

## 2022-09-22 RX ORDER — METOPROLOL TARTRATE 50 MG
5 TABLET ORAL EVERY 6 HOURS
Refills: 0 | Status: ACTIVE | OUTPATIENT
Start: 2022-09-22 | End: 2023-08-21

## 2022-09-22 RX ORDER — HYDROMORPHONE HYDROCHLORIDE 2 MG/ML
0.5 INJECTION INTRAMUSCULAR; INTRAVENOUS; SUBCUTANEOUS
Refills: 0 | Status: DISCONTINUED | OUTPATIENT
Start: 2022-09-22 | End: 2022-09-22

## 2022-09-22 RX ORDER — TOBRAMYCIN 0.3 %
1 DROPS OPHTHALMIC (EYE) ONCE
Refills: 0 | Status: COMPLETED | OUTPATIENT
Start: 2022-09-22 | End: 2022-09-22

## 2022-09-22 RX ORDER — INSULIN LISPRO 100/ML
VIAL (ML) SUBCUTANEOUS
Refills: 0 | Status: ACTIVE | OUTPATIENT
Start: 2022-09-22 | End: 2023-08-21

## 2022-09-22 RX ORDER — GLUCAGON INJECTION, SOLUTION 0.5 MG/.1ML
1 INJECTION, SOLUTION SUBCUTANEOUS ONCE
Refills: 0 | Status: ACTIVE | OUTPATIENT
Start: 2022-09-22 | End: 2023-08-21

## 2022-09-22 RX ADMIN — Medication 15 MILLIGRAM(S): at 16:00

## 2022-09-22 RX ADMIN — Medication 15 MILLIGRAM(S): at 21:32

## 2022-09-22 RX ADMIN — Medication 6: at 13:08

## 2022-09-22 RX ADMIN — Medication 1 DROP(S): at 21:32

## 2022-09-22 RX ADMIN — GABAPENTIN 300 MILLIGRAM(S): 400 CAPSULE ORAL at 21:34

## 2022-09-22 RX ADMIN — ONDANSETRON 8 MILLIGRAM(S): 8 TABLET, FILM COATED ORAL at 21:34

## 2022-09-22 RX ADMIN — Medication 1000 MILLIGRAM(S): at 07:11

## 2022-09-22 RX ADMIN — Medication 4: at 16:00

## 2022-09-22 RX ADMIN — GABAPENTIN 300 MILLIGRAM(S): 400 CAPSULE ORAL at 16:01

## 2022-09-22 RX ADMIN — Medication 15 MILLIGRAM(S): at 16:25

## 2022-09-22 RX ADMIN — Medication 1 DROP(S): at 19:58

## 2022-09-22 RX ADMIN — ENOXAPARIN SODIUM 40 MILLIGRAM(S): 100 INJECTION SUBCUTANEOUS at 19:35

## 2022-09-22 RX ADMIN — CELECOXIB 400 MILLIGRAM(S): 200 CAPSULE ORAL at 07:11

## 2022-09-22 RX ADMIN — GABAPENTIN 600 MILLIGRAM(S): 400 CAPSULE ORAL at 07:11

## 2022-09-22 NOTE — DISCHARGE NOTE PROVIDER - CARE PROVIDER_API CALL
Rossana Valentine (MD)  Female Pelvic MedReconst Surg; Obstetrics and Gynecology  [address], [address]  [place] [address]  Phone: [phone]  Fax: [phone]  Follow Up Time:

## 2022-09-22 NOTE — DISCHARGE NOTE PROVIDER - NSDCFUSCHEDAPPT_GEN_ALL_CORE_FT
[facility] Physician [facility]  UROGYN [address]  Scheduled Appointment: 10/04/2022

## 2022-09-22 NOTE — BRIEF OPERATIVE NOTE - OPERATION/FINDINGS
Incomplete uterovaginal prolapse. Grossly normal appearing bilateral fallopian tubes and ovaries. Surgiflo placed at end of procedure. On cystoscopy, normal bladder mucosa and no suture, mesh, injury,  or foreign body noted.  Bilateral ureteral orifices were identified and effluxing clear yellow urine.

## 2022-09-22 NOTE — CHART NOTE - NSCHARTNOTEFT_GEN_A_CORE
GYN POST-OP CHECK    Allergies: penicillin (Other), cipro (diarrhea)      S: Pt awake and alert resting comfortably in bed.   Pain controlled. Pt denies N/V, SOB, CP, palpitations. Patient has not yet had clears yet. Not OOB yet.    O:   T(C): 36.4 (09-22-22 @ 12:00), Max: 36.4 (09-22-22 @ 12:00)  HR: 71 (09-22-22 @ 14:00) (69 - 75)  BP: 137/67 (09-22-22 @ 14:00) (130/63 - 163/69)  RR: 17 (09-22-22 @ 14:00) (16 - 18)  SpO2: 95% (09-22-22 @ 14:00) (93% - 100%)  I&O's Summary    22 Sep 2022 07:01  -  22 Sep 2022 14:34  --------------------------------------------------------  IN: 150 mL / OUT: 175 mL / NET: -25 mL      Gen: NAD. A+Ox3  CV: S1S2, RRR  Lungs: CTA B/L  Abd: +BS. soft, gently distended and appropriately tender.  Inc: Clean/dry/intact  Ext: PAS in place      A/P: 72y Female now POD#0 s/p supracervical hysterectomy, b/l salpingectomy, sacrocolpopexy, mud-urethral sling and cystoscopy.    PAST MEDICAL & SURGICAL HISTORY: CAD s/p PTCA and coronory artery stenting, HTN, Type 2 DM, osteoarthritis s/p arthroplasty of L hip, uterovaginal prolapse, cataract extraction    1. Neuro: Analgesia PRN  acetaminophen     Tablet .. 1000 milliGRAM(s) Oral every 6 hours  gabapentin 300 milliGRAM(s) Oral every 8 hours  HYDROmorphone  Injectable 0.25 milliGRAM(s) IV Push every 10 minutes PRN  HYDROmorphone  Injectable 0.5 milliGRAM(s) IV Push every 10 minutes PRN  ketorolac   Injectable 15 milliGRAM(s) IV Push every 8 hours  ondansetron    Tablet 8 milliGRAM(s) Oral every 8 hours  oxyCODONE    IR 5 milliGRAM(s) Oral every 4 hours PRN  2. Card: H/H to be drawn @3pm. CBC in AM.   3. Pulm: Incentive spirometer use.    4. GI: Advance to regular diet. Anti-emetics PRN. Insulin sliding scale   5. : Neves to gravity. TOV in AM  6. Electrolytes: LR@75cc/hr.   7. DVT ppx w/ PAS while in bed. Early ambulation, initially with assistance then as tolerated.  8. Discharge from PACU when criteria met.     d/w GYN team  Anita Sands PA-C
Called to bedside to evaluate patient for corneal abrasion. Pt c/o " something in her left eye". Denies vial disturbances. Corneal abrasion protocol initiated. Instant relief noted from tetracaine. Patient verbalized understanding of the fact that tetracaine will wear off and if pain gets much worse and or visual disturbances occur. call RN to contact service for an opthalmology consult.
Urogynecology Fellow Note    Patient seen at bedside in PACU, resting comfortably. Vaginal packing x1 removed, mild staining noted with no active bleeding. Vital signs stable, rosas draining clear yellow urine. Reviewed plan of care with patient, all questions answered and addressed.     DARRIN Wood MD PGY6

## 2022-09-22 NOTE — PATIENT PROFILE ADULT - PRO INTERPRETER NEED 2
Mariya Kim, [address]       10/15/21        Patient: Fer Mckenzie   YOB: 2011   Date of Visit: 10/15/2021       Dear  Dr. Iva Colunga MD,      Thank you for referring Keaton Bryant
English

## 2022-09-22 NOTE — DISCHARGE NOTE PROVIDER - HOSPITAL COURSE
Patient underwent an uncomplicated robotic assisted supracervical hysterectomy, bilateral salpingectomy, sacrocolpopexy, mid-urethral sling, and cystoscopy for pelvic organ prolapse EBL:  100cc. Hct:   .      POD#0 ERP protocol for pain was used and pain was well controlled. Patient's diet was advanced and she tolerated regular diet without issues. Neves catheter remained in place overnight. Vaginal packing was removed without issue. Postoperative H/H was stable compared to preop. ***    POD#1 *** Labs drawn on POD#1 were stable compared to pre-op. Active trial of void was performed, patient passed and Neves catheter was discontinued. *** Routine post-operative care was performed.  No notable events. On day of discharge, patient was deemed stable for discharge as she was meeting postoperative milestones - tolerating regular diet, ambulating without difficulty, voiding, and pain was well controlled on oral medications. Throughout admission, patient received DVT prophylaxis with SCDs and early ambulation.     Upon discharge on POD#1, the patient is ambulating, voiding spontaneously, tolerating oral intake, pain was well controlled with oral medication, and vital signs were stable. Patient was instructed to follow up with Dr. Valentine on 10/4/22 at 10:30am     Patient underwent an uncomplicated robotic assisted supracervical hysterectomy, bilateral salpingectomy, sacrocolpopexy, mid-urethral sling, and cystoscopy for pelvic organ prolapse EBL:  100cc.     POD#0 ERP protocol for pain was used and pain was well controlled. Patient's diet was advanced and she tolerated regular diet without issues. Neves catheter remained in place overnight. Vaginal packing was removed without issue. Postoperative H/H was stable compared to preop.     POD#1 Labs drawn on POD#1 were stable compared to pre-op on two consecutive draws. Active trial of void was performed, patient passed and Neves catheter was discontinued. Routine post-operative care was performed.  No notable events. On day of discharge, patient was deemed stable for discharge as she was meeting postoperative milestones - tolerating regular diet, ambulating without difficulty, voiding, and pain was well controlled on oral medications. Throughout admission, patient received DVT prophylaxis with SCDs and early ambulation.     Upon discharge on POD#1, the patient is ambulating, voiding spontaneously, tolerating oral intake, pain was well controlled with oral medication, and vital signs were stable. Patient was instructed to follow up with Dr. Valentine on 10/4/22 at 10:30am

## 2022-09-22 NOTE — DISCHARGE NOTE PROVIDER - NSDCCPTREATMENT_GEN_ALL_CORE_FT
PRINCIPAL PROCEDURE  Procedure: Hysterectomy, supracervical, robot-assisted, with sacrocolpopexy  Findings and Treatment:       SECONDARY PROCEDURE  Procedure: Creation, midurethral sling, with cystoscopy  Findings and Treatment:     Procedure: Salpingectomy, bilateral, robot-assisted  Findings and Treatment:

## 2022-09-22 NOTE — BRIEF OPERATIVE NOTE - NSICDXBRIEFPROCEDURE_GEN_ALL_CORE_FT
PROCEDURES:  Hysterectomy, supracervical, robot-assisted, with sacrocolpopexy 22-Sep-2022 12:06:07  Kaylen Wood  Salpingectomy, bilateral, robot-assisted 22-Sep-2022 12:06:27  Kaylen Wood  Creation, midurethral sling, with cystoscopy 22-Sep-2022 12:06:51  Kaylen Wood

## 2022-09-22 NOTE — DISCHARGE NOTE PROVIDER - NSDCFUADDINST_GEN_ALL_CORE_FT
Postoperative Instructions    Bowel regimen    To avoid constipation and straining, we suggest the following (simultaneously):  1. Colace (stool softener) 100mg, one pill three times a day (breakfast, lunch, dinner). If stool is too soft, decrease to twice a day (breakfast, dinner)  If still constipated, you can add: Miralax once at bedtime  All of these agents can be obtained over the counter at the pharmacy.      Pain control.    For pain control, take the followin.  Motrin 800mg three times a day, take with food  2.  Add Tylenol as needed if still have pain despite Motrin  Motrin and Tylenol can be obtained over the counter.      Postoperative urinary catheter    If you failed your voiding trial in the hospital and are sent home with a catheter, please call the office ([phone]) so you can come in to have a repeat voiding trial/catheter removal in a few days.      Postoperative restrictions    Nothing in the vagina (tampons, sexual intercourse), No tub baths, pools or hot tubs for 6 weeks (showers are ok!)  No lifting anything heavier than 10 lbs, no strenuous exercise for 2 weeks after surgery. Do not pull or cut any stitches that you see around your incision.      Vaginal bleeding    Spotting and intermittent passage of blood clots per vagina is normal in first few weeks after surgery. If you are soaking 1 pad per hour, that is not normal and you should notify my office and seek medical attention right away.      Vaginal discharge    Vaginal discharge (all colors) is normal after vaginal surgery. If you’ve had vaginal surgery, you have sutures in your vagina which take 3 months to fully absorb. You may have vaginal discharge during this time. This is normal.

## 2022-09-22 NOTE — DISCHARGE NOTE PROVIDER - NSDCMRMEDTOKEN_GEN_ALL_CORE_FT
aspirin 81 mg oral tablet: morning   atorvastatin 20 mg oral tablet: 1 tab(s) orally once a day, morning   carvedilol 12.5 mg oral tablet: 1 tab(s) orally 2 times a day  glimepiride 2 mg oral tablet: 1 tab(s) orally once a day  Janumet 50 mg-1000 mg oral tablet: 1 tab(s) orally 2 times a day  losartan 25 mg oral tablet: 1 tab(s) orally once a day, morning   Myrbetriq 25 mg oral tablet, extended release: 1 tab(s) orally once a day  alternate with 50 mg orally  oxyCODONE 5 mg oral tablet: 1 tab(s) orally every 8 hours, As Needed -for severe pain MDD:3 tabs   rivaroxaban 2.5 mg oral tablet: 1 tab(s) orally 2 times a day

## 2022-09-22 NOTE — PATIENT PROFILE ADULT - DATE OF SECOND COVID-19 BOOSTER
I returned patient's call and told him that his PA has been started. We will call him back when medication has been approved. Patient denied further questions or concerns at this time.   
Ingrid from [facility] called to advise a prior auth needs to be started for for his hydroxychloroquine (PLAQUENIL) 200 MG tablet.    Thank you  
Patient calling to discuss the below medication.    Please call the patient back to discuss further.     
Patient states that insurance will only cover 30 days of hydroxychloroquine (PLAQUENIL) 200 MG tablet.  Patient states that Pharmacy needs to be contacted about getting a 90 day supply Patient states that authorization needs to be done.    Pharmacy is also sending over what needs to be done also    
Pharmacy calling  for prior authorization on:    Medication: Hydroxychloroquine   Dosage: 200 mg bid  Quantity requested:  180  Pharmacy for prescription has been selected.    Initiation of prior authorization needed.  
Ran a test claim and PA is not needed for medication. Went through 180 for 90 days. Called the pharmacy and helped them get the prescription to go through. - received paid claim.  Pharmacy will contact patient when medication is ready to be picked up.   EPA team will close this encounter.       
Still waiting for PA.    hydroxychloroquine (PLAQUENIL) 200 MG tablet 180 tablet 3 1/28/2021     Sig - Route: Take 1 tablet by mouth 2 times daily. - Oral    Sent to pharmacy as: Hydroxychloroquine Sulfate 200 MG Oral Tablet (PLAQUENIL)    Class: Eprescribe    E-Prescribing Status: Receipt confirmed by pharmacy (1/28/2021 11:10 AM CST)    Prior authorization: Waiting for Auth Details        
30-Mar-2022

## 2022-09-22 NOTE — PATIENT PROFILE ADULT - FALL HARM RISK - HARM RISK INTERVENTIONS

## 2022-09-23 ENCOUNTER — TRANSCRIPTION ENCOUNTER (OUTPATIENT)
Age: 72
End: 2022-09-23

## 2022-09-23 VITALS
RESPIRATION RATE: 17 BRPM | SYSTOLIC BLOOD PRESSURE: 132 MMHG | HEART RATE: 70 BPM | DIASTOLIC BLOOD PRESSURE: 65 MMHG | OXYGEN SATURATION: 96 % | TEMPERATURE: 98 F

## 2022-09-23 LAB
A1C WITH ESTIMATED AVERAGE GLUCOSE RESULT: 6.9 % — HIGH (ref 4–5.6)
ANION GAP SERPL CALC-SCNC: 9 MMOL/L — SIGNIFICANT CHANGE UP (ref 5–17)
BUN SERPL-MCNC: 19 MG/DL — SIGNIFICANT CHANGE UP (ref 7–23)
CALCIUM SERPL-MCNC: 8.9 MG/DL — SIGNIFICANT CHANGE UP (ref 8.4–10.5)
CHLORIDE SERPL-SCNC: 105 MMOL/L — SIGNIFICANT CHANGE UP (ref 96–108)
CO2 SERPL-SCNC: 24 MMOL/L — SIGNIFICANT CHANGE UP (ref 22–31)
CREAT SERPL-MCNC: 0.96 MG/DL — SIGNIFICANT CHANGE UP (ref 0.5–1.3)
EGFR: 63 ML/MIN/1.73M2 — SIGNIFICANT CHANGE UP
ESTIMATED AVERAGE GLUCOSE: 151 MG/DL — HIGH (ref 68–114)
GLUCOSE BLDC GLUCOMTR-MCNC: 150 MG/DL — HIGH (ref 70–99)
GLUCOSE BLDC GLUCOMTR-MCNC: 188 MG/DL — HIGH (ref 70–99)
GLUCOSE BLDC GLUCOMTR-MCNC: 284 MG/DL — HIGH (ref 70–99)
GLUCOSE SERPL-MCNC: 164 MG/DL — HIGH (ref 70–99)
HCT VFR BLD CALC: 29.7 % — LOW (ref 34.5–45)
HCT VFR BLD CALC: 30 % — LOW (ref 34.5–45)
HGB BLD-MCNC: 9 G/DL — LOW (ref 11.5–15.5)
HGB BLD-MCNC: 9.3 G/DL — LOW (ref 11.5–15.5)
MCHC RBC-ENTMCNC: 26.1 PG — LOW (ref 27–34)
MCHC RBC-ENTMCNC: 31 GM/DL — LOW (ref 32–36)
MCV RBC AUTO: 84 FL — SIGNIFICANT CHANGE UP (ref 80–100)
NRBC # BLD: 0 /100 WBCS — SIGNIFICANT CHANGE UP (ref 0–0)
PLATELET # BLD AUTO: 196 K/UL — SIGNIFICANT CHANGE UP (ref 150–400)
POTASSIUM SERPL-MCNC: 3.9 MMOL/L — SIGNIFICANT CHANGE UP (ref 3.5–5.3)
POTASSIUM SERPL-SCNC: 3.9 MMOL/L — SIGNIFICANT CHANGE UP (ref 3.5–5.3)
RBC # BLD: 3.57 M/UL — LOW (ref 3.8–5.2)
RBC # FLD: 16.5 % — HIGH (ref 10.3–14.5)
SODIUM SERPL-SCNC: 138 MMOL/L — SIGNIFICANT CHANGE UP (ref 135–145)
WBC # BLD: 8.86 K/UL — SIGNIFICANT CHANGE UP (ref 3.8–10.5)
WBC # FLD AUTO: 8.86 K/UL — SIGNIFICANT CHANGE UP (ref 3.8–10.5)

## 2022-09-23 PROCEDURE — U0003: CPT

## 2022-09-23 PROCEDURE — 88305 TISSUE EXAM BY PATHOLOGIST: CPT

## 2022-09-23 PROCEDURE — 86850 RBC ANTIBODY SCREEN: CPT

## 2022-09-23 PROCEDURE — C9399: CPT

## 2022-09-23 PROCEDURE — 57425 LAPAROSCOPY SURG COLPOPEXY: CPT

## 2022-09-23 PROCEDURE — S2900: CPT

## 2022-09-23 PROCEDURE — 86901 BLOOD TYPING SEROLOGIC RH(D): CPT

## 2022-09-23 PROCEDURE — C1781: CPT

## 2022-09-23 PROCEDURE — 82962 GLUCOSE BLOOD TEST: CPT

## 2022-09-23 PROCEDURE — 85014 HEMATOCRIT: CPT

## 2022-09-23 PROCEDURE — C1771: CPT

## 2022-09-23 PROCEDURE — U0005: CPT

## 2022-09-23 PROCEDURE — C9803: CPT

## 2022-09-23 PROCEDURE — 85018 HEMOGLOBIN: CPT

## 2022-09-23 PROCEDURE — 85027 COMPLETE CBC AUTOMATED: CPT

## 2022-09-23 PROCEDURE — 57288 REPAIR BLADDER DEFECT: CPT

## 2022-09-23 PROCEDURE — 80048 BASIC METABOLIC PNL TOTAL CA: CPT

## 2022-09-23 PROCEDURE — 58542 LSH W/T/O UT 250 G OR LESS: CPT

## 2022-09-23 PROCEDURE — C1889: CPT

## 2022-09-23 PROCEDURE — 86900 BLOOD TYPING SEROLOGIC ABO: CPT

## 2022-09-23 PROCEDURE — 83036 HEMOGLOBIN GLYCOSYLATED A1C: CPT

## 2022-09-23 PROCEDURE — 36415 COLL VENOUS BLD VENIPUNCTURE: CPT

## 2022-09-23 RX ORDER — SODIUM CHLORIDE 9 MG/ML
3 INJECTION INTRAMUSCULAR; INTRAVENOUS; SUBCUTANEOUS EVERY 8 HOURS
Refills: 0 | Status: ACTIVE | OUTPATIENT
Start: 2022-09-23 | End: 2023-08-22

## 2022-09-23 RX ADMIN — SODIUM CHLORIDE 3 MILLILITER(S): 9 INJECTION INTRAMUSCULAR; INTRAVENOUS; SUBCUTANEOUS at 14:10

## 2022-09-23 RX ADMIN — Medication 1000 MILLIGRAM(S): at 00:37

## 2022-09-23 RX ADMIN — Medication 1000 MILLIGRAM(S): at 05:49

## 2022-09-23 RX ADMIN — Medication 1 DROP(S): at 13:40

## 2022-09-23 RX ADMIN — Medication 1000 MILLIGRAM(S): at 13:39

## 2022-09-23 RX ADMIN — ONDANSETRON 8 MILLIGRAM(S): 8 TABLET, FILM COATED ORAL at 05:49

## 2022-09-23 RX ADMIN — Medication 2: at 18:54

## 2022-09-23 RX ADMIN — Medication 1 DROP(S): at 10:12

## 2022-09-23 RX ADMIN — Medication 6: at 13:38

## 2022-09-23 RX ADMIN — GABAPENTIN 300 MILLIGRAM(S): 400 CAPSULE ORAL at 05:45

## 2022-09-23 RX ADMIN — POLYETHYLENE GLYCOL 3350 17 GRAM(S): 17 POWDER, FOR SOLUTION ORAL at 16:09

## 2022-09-23 RX ADMIN — Medication 1 DROP(S): at 05:45

## 2022-09-23 RX ADMIN — Medication 1 DROP(S): at 01:05

## 2022-09-23 RX ADMIN — GABAPENTIN 300 MILLIGRAM(S): 400 CAPSULE ORAL at 13:39

## 2022-09-23 NOTE — PROGRESS NOTE ADULT - SUBJECTIVE AND OBJECTIVE BOX
GYN Progress Note      POD#1   HD#2    Patient seen and examined at bedside.  No acute events overnight. No acute complaints.  Pain well controlled.  Patient is ambulating and tolerating regular diet.  Rosas is still in place.   Denies CP, SOB, N/V, fevers, and chills.    Vital Signs Last 24 Hours  T(C): 36.7 (09-23-22 @ 02:00), Max: 36.9 (09-22-22 @ 20:00)  HR: 72 (09-23-22 @ 02:00) (69 - 86)  BP: 115/71 (09-23-22 @ 02:00) (115/71 - 166/72)  RR: 17 (09-23-22 @ 02:00) (16 - 20)  SpO2: 97% (09-23-22 @ 02:00) (93% - 100%)    I&O's Summary    22 Sep 2022 07:01  -  23 Sep 2022 05:18  --------------------------------------------------------  IN: 1500 mL / OUT: 1370 mL / NET: 130 mL        Physical Exam:  General: NAD  CV: RRR  Lungs: CTAB  Abdomen: soft, appropriately-tender, softly distended, normoactive bowel sounds  Incision: lsc sites CDI  : rosas in situ  Ext: no pain or swelling     Labs:                        10.9   x     )-----------( x        ( 22 Sep 2022 15:16 )             34.4   baso x      eos x      imm gran x      lymph x      mono x      poly x          MEDICATIONS  (STANDING):  acetaminophen     Tablet .. 1000 milliGRAM(s) Oral every 6 hours  dextrose 5%. 1000 milliLiter(s) (50 mL/Hr) IV Continuous <Continuous>  dextrose 5%. 1000 milliLiter(s) (100 mL/Hr) IV Continuous <Continuous>  dextrose 50% Injectable 25 Gram(s) IV Push once  dextrose 50% Injectable 12.5 Gram(s) IV Push once  dextrose 50% Injectable 25 Gram(s) IV Push once  gabapentin 300 milliGRAM(s) Oral every 8 hours  gentamicin   IVPB 370 milliGRAM(s) IV Intermittent once  glucagon  Injectable 1 milliGRAM(s) IntraMuscular once  insulin lispro (ADMELOG) corrective regimen sliding scale   SubCutaneous three times a day before meals  insulin lispro (ADMELOG) corrective regimen sliding scale   SubCutaneous at bedtime  ketorolac   Injectable 15 milliGRAM(s) IV Push every 8 hours  lactated ringers. 1000 milliLiter(s) (75 mL/Hr) IV Continuous <Continuous>  metroNIDAZOLE  IVPB 500 milliGRAM(s) IV Intermittent once  ondansetron    Tablet 8 milliGRAM(s) Oral every 8 hours  tobramycin 0.3% Ophthalmic Solution      tobramycin 0.3% Ophthalmic Solution 1 Drop(s) Left EYE every 4 hours    MEDICATIONS  (PRN):  acetaminophen     Tablet .. 1000 milliGRAM(s) Oral every 6 hours PRN Mild Pain (1 - 3)  dextrose Oral Gel 15 Gram(s) Oral once PRN Blood Glucose LESS THAN 70 milliGRAM(s)/deciliter  metoprolol tartrate Injectable 5 milliGRAM(s) IV Push every 6 hours PRN elevated BP  oxyCODONE    IR 5 milliGRAM(s) Oral every 4 hours PRN Severe Pain (7 - 10)  polyethylene glycol 3350 17 Gram(s) Oral at bedtime PRN Constipation  simethicone 80 milliGRAM(s) Chew every 6 hours PRN Gas       GYN Progress Note      POD#1   HD#2    Patient seen and examined at bedside.  No acute events overnight. No acute complaints.  Pain well controlled, reports pain currently 0/10.   Patient is ambulating and tolerating regular diet.  Rosas is still in place.   Denies CP, SOB, N/V, fevers, and chills.    Vital Signs Last 24 Hours  T(C): 36.7 (09-23-22 @ 02:00), Max: 36.9 (09-22-22 @ 20:00)  HR: 72 (09-23-22 @ 02:00) (69 - 86)  BP: 115/71 (09-23-22 @ 02:00) (115/71 - 166/72)  RR: 17 (09-23-22 @ 02:00) (16 - 20)  SpO2: 97% (09-23-22 @ 02:00) (93% - 100%)    I&O's Summary    22 Sep 2022 07:01  -  23 Sep 2022 05:18  --------------------------------------------------------  IN: 1500 mL / OUT: 1370 mL / NET: 130 mL        Physical Exam:  General: NAD  CV: RRR  Lungs: CTAB  Abdomen: soft, appropriately-tender, softly distended, normoactive bowel sounds  Incision: lsc sites CDI  : rosas in situ, no vaginal bleeding noted   Ext: no pain or swelling     Labs:                        10.9   x     )-----------( x        ( 22 Sep 2022 15:16 )             34.4   baso x      eos x      imm gran x      lymph x      mono x      poly x          MEDICATIONS  (STANDING):  acetaminophen     Tablet .. 1000 milliGRAM(s) Oral every 6 hours  dextrose 5%. 1000 milliLiter(s) (50 mL/Hr) IV Continuous <Continuous>  dextrose 5%. 1000 milliLiter(s) (100 mL/Hr) IV Continuous <Continuous>  dextrose 50% Injectable 25 Gram(s) IV Push once  dextrose 50% Injectable 12.5 Gram(s) IV Push once  dextrose 50% Injectable 25 Gram(s) IV Push once  gabapentin 300 milliGRAM(s) Oral every 8 hours  gentamicin   IVPB 370 milliGRAM(s) IV Intermittent once  glucagon  Injectable 1 milliGRAM(s) IntraMuscular once  insulin lispro (ADMELOG) corrective regimen sliding scale   SubCutaneous three times a day before meals  insulin lispro (ADMELOG) corrective regimen sliding scale   SubCutaneous at bedtime  ketorolac   Injectable 15 milliGRAM(s) IV Push every 8 hours  lactated ringers. 1000 milliLiter(s) (75 mL/Hr) IV Continuous <Continuous>  metroNIDAZOLE  IVPB 500 milliGRAM(s) IV Intermittent once  ondansetron    Tablet 8 milliGRAM(s) Oral every 8 hours  tobramycin 0.3% Ophthalmic Solution      tobramycin 0.3% Ophthalmic Solution 1 Drop(s) Left EYE every 4 hours    MEDICATIONS  (PRN):  acetaminophen     Tablet .. 1000 milliGRAM(s) Oral every 6 hours PRN Mild Pain (1 - 3)  dextrose Oral Gel 15 Gram(s) Oral once PRN Blood Glucose LESS THAN 70 milliGRAM(s)/deciliter  metoprolol tartrate Injectable 5 milliGRAM(s) IV Push every 6 hours PRN elevated BP  oxyCODONE    IR 5 milliGRAM(s) Oral every 4 hours PRN Severe Pain (7 - 10)  polyethylene glycol 3350 17 Gram(s) Oral at bedtime PRN Constipation  simethicone 80 milliGRAM(s) Chew every 6 hours PRN Gas       GYN Progress Note      POD#1   HD#2    Patient seen and examined at bedside.  No acute events overnight. No acute complaints.  Pain well controlled, reports pain currently 0/10.   Patient is ambulating and tolerating regular diet.  Rosas is still in place.   Denies CP, SOB, N/V, fevers, and chills.    Vital Signs Last 24 Hours  T(C): 36.7 (09-23-22 @ 02:00), Max: 36.9 (09-22-22 @ 20:00)  HR: 72 (09-23-22 @ 02:00) (69 - 86)  BP: 115/71 (09-23-22 @ 02:00) (115/71 - 166/72)  RR: 17 (09-23-22 @ 02:00) (16 - 20)  SpO2: 97% (09-23-22 @ 02:00) (93% - 100%)    I&O's Summary    22 Sep 2022 07:01  -  23 Sep 2022 05:18  --------------------------------------------------------  IN: 1500 mL / OUT: 1370 mL / NET: 130 mL        Physical Exam:  General: NAD  Lungs: breathing comfortably on RA  Abdomen: soft, appropriately-tender, softly distended  Incision: lsc sites CDI  : rosas in situ, no vaginal bleeding noted   Ext: no pain or swelling     Labs:                        10.9   x     )-----------( x        ( 22 Sep 2022 15:16 )             34.4   baso x      eos x      imm gran x      lymph x      mono x      poly x          MEDICATIONS  (STANDING):  acetaminophen     Tablet .. 1000 milliGRAM(s) Oral every 6 hours  dextrose 5%. 1000 milliLiter(s) (50 mL/Hr) IV Continuous <Continuous>  dextrose 5%. 1000 milliLiter(s) (100 mL/Hr) IV Continuous <Continuous>  dextrose 50% Injectable 25 Gram(s) IV Push once  dextrose 50% Injectable 12.5 Gram(s) IV Push once  dextrose 50% Injectable 25 Gram(s) IV Push once  gabapentin 300 milliGRAM(s) Oral every 8 hours  gentamicin   IVPB 370 milliGRAM(s) IV Intermittent once  glucagon  Injectable 1 milliGRAM(s) IntraMuscular once  insulin lispro (ADMELOG) corrective regimen sliding scale   SubCutaneous three times a day before meals  insulin lispro (ADMELOG) corrective regimen sliding scale   SubCutaneous at bedtime  ketorolac   Injectable 15 milliGRAM(s) IV Push every 8 hours  lactated ringers. 1000 milliLiter(s) (75 mL/Hr) IV Continuous <Continuous>  metroNIDAZOLE  IVPB 500 milliGRAM(s) IV Intermittent once  ondansetron    Tablet 8 milliGRAM(s) Oral every 8 hours  tobramycin 0.3% Ophthalmic Solution      tobramycin 0.3% Ophthalmic Solution 1 Drop(s) Left EYE every 4 hours    MEDICATIONS  (PRN):  acetaminophen     Tablet .. 1000 milliGRAM(s) Oral every 6 hours PRN Mild Pain (1 - 3)  dextrose Oral Gel 15 Gram(s) Oral once PRN Blood Glucose LESS THAN 70 milliGRAM(s)/deciliter  metoprolol tartrate Injectable 5 milliGRAM(s) IV Push every 6 hours PRN elevated BP  oxyCODONE    IR 5 milliGRAM(s) Oral every 4 hours PRN Severe Pain (7 - 10)  polyethylene glycol 3350 17 Gram(s) Oral at bedtime PRN Constipation  simethicone 80 milliGRAM(s) Chew every 6 hours PRN Gas

## 2022-09-23 NOTE — DISCHARGE NOTE NURSING/CASE MANAGEMENT/SOCIAL WORK - NSDCPEFALRISK_GEN_ALL_CORE
For information on Fall & Injury Prevention, visit: [email or web address] OR  [email or web address] OR  https://www.cdc.gov/steadi/patient.html

## 2022-09-23 NOTE — PROGRESS NOTE ADULT - ASSESSMENT
A/P: 72y POD#1 s/p LSC RA HATTIE, SCP, MUS, Cysto.  Corneal abrasion noted on POD#0.  Patient is stable and doing well.      Neuro: PO pain meds.  Corneal abrasion protocol.  CV: Hemodynamically stable.  F/u AM CBC  Pulm: Saturating well on room air, encourage oob/amb  GI: Continue regular diet  : Neves in situ, active TOV today  Heme: c/w SCDs for DVT ppx  FEN: LR@75.  F/u AM BMP  ID: Afebrile  Endo: ISS  Dispo: Discharge planning    Bandar Martin, PGY4 A/P: 72y POD#1 s/p LSC RA HATTIE, SCP, MUS, Cysto.  Corneal abrasion noted on POD#0.  Patient is stable and doing well.      Neuro: PO pain meds.  Corneal abrasion protocol.  CV: Hemodynamically stable.  F/u AM CBC  Pulm: Saturating well on room air, encourage oob/amb  GI: Continue regular diet  : Neves in situ, active TOV today  Heme: c/w SCDs for DVT ppx. S/p Lovenox x1 last night.   FEN: LR@75.  F/u AM BMP  ID: Afebrile  Endo: ISS  Dispo: Discharge planning    Bandar Martin, PGY4    ----  Urogynecology Fellow Note     Patient reports that she is doing well and is without complaint this morning. She denies nausea, vomiting, fevers, chills. Reports that she tolerated diet last night and ambulated to chair without issue. She feels well.     VSS. UOP adequate (585cc/shift - 48cc/hr). AM labs pending   Preop Hct 37.0, postop (4 hours later yesterday) 34.4    Exam:   Gen: NAD, resting comfortably in bed  CVS: RRR  Lung: no use of accessory muscles  Abdomen: Soft, appropriately tender, softly distended. Incision sites covered with steri strips.   : Neves catheter draining clear yellow urine.   Ext: SCDs on and functioning     Assessment:   Chelle is POD1 s/p RA-HATTIE, SCP, BS, MUS, cystoscopy, in stable condition and doing well  this morning.     Plan:   -AM labs pending   -TOV today  -Continue diet regular  -Avoid NSAIDs - continue with Tylenol and Oxycodone for pain   -T2DM - importance of euglycemia in postoperative period  -CAD - pending AM labs, will instruct patient on resumed home Xalrelto; s/p HSQ preop and Lovenox x1 last night   -Corneal abrasion- continue with eye drops as prescribed   -Ambulation encouraged   -Dispo: likely home pending the above    DARRIN Wood MD PGY6

## 2022-09-23 NOTE — DISCHARGE NOTE NURSING/CASE MANAGEMENT/SOCIAL WORK - PATIENT PORTAL LINK FT
You can access the FollowMyHealth Patient Portal offered by [facility] by registering at the following website: [email or web address]. By joining [employer]’s FollowMyHealth portal, you will also be able to view your health information using other applications (apps) compatible with our system.

## 2022-09-29 LAB — SURGICAL PATHOLOGY STUDY: SIGNIFICANT CHANGE UP

## 2022-10-04 ENCOUNTER — APPOINTMENT (OUTPATIENT)
Dept: UROGYNECOLOGY | Facility: CLINIC | Age: 72
End: 2022-10-04

## 2022-10-04 PROCEDURE — 99024 POSTOP FOLLOW-UP VISIT: CPT

## 2022-10-04 NOTE — SUBJECTIVE
[FreeTextEntry1] : Feels generally well. [FreeTextEntry8] : Resumed xarelto and aspirin without issue. [FreeTextEntry7] : Denies any pain.  Took 4-5 oxycodone total. [FreeTextEntry6] : Eating well. [FreeTextEntry5] : No symptoms of TONE.  Does have urinary urgency-unchanged from preop. [FreeTextEntry4] : Having BMs almost no daily.  Taking metamucil; did not tolerate. [FreeTextEntry3] : Ambulating daily without issue.

## 2022-10-04 NOTE — OBJECTIVE
[Soft and Nontender] : soft and nontender [Clean, Dry, Intact] : Clean, Dry, Intact [Good Support] : Good support [Healing well] : healing well [Post Void Residual ____ ml] : Post Void Residual was [unfilled] ml [FreeTextEntry3] : no mesh tenderness or exposure

## 2022-10-04 NOTE — DISCUSSION/SUMMARY
[FreeTextEntry1] : 72F s/p RA HATTIE, BS, SCP, MUS, cystoscopy on 9/22, doing well.  Discussed that she will continue taking metamucil rather than miralax to optimize bowel function.  Continue taking aspirin and xarelto as directed by medical doctor.  Postoperative restrictions and instructions reviewed. She was counseled to call if any questions and RTO in 4 weeks or sooner, should issues arise. Patient verbalized understanding.  All questions answered.\par

## 2022-10-12 ENCOUNTER — APPOINTMENT (OUTPATIENT)
Dept: UROGYNECOLOGY | Facility: CLINIC | Age: 72
End: 2022-10-12

## 2022-11-02 ENCOUNTER — APPOINTMENT (OUTPATIENT)
Dept: UROGYNECOLOGY | Facility: CLINIC | Age: 72
End: 2022-11-02

## 2022-11-02 VITALS
BODY MASS INDEX: 31.28 KG/M2 | HEIGHT: 62 IN | WEIGHT: 170 LBS | SYSTOLIC BLOOD PRESSURE: 155 MMHG | HEART RATE: 84 BPM | DIASTOLIC BLOOD PRESSURE: 82 MMHG | RESPIRATION RATE: 16 BRPM | TEMPERATURE: 96.8 F

## 2022-11-02 PROCEDURE — 99024 POSTOP FOLLOW-UP VISIT: CPT

## 2022-11-08 NOTE — DISCUSSION/SUMMARY
[FreeTextEntry1] : 1. Postop state\par - Reviewed postoperative instructions and clearance provided to resume normal activities at this time \par - Patient doing well, understands she needs to call for any issues or present to ER for any acute changes \par - She desires to proceed with Pelvic Floor PT and referral provided \par \par 2. OAB\par - The etiology of OAB was discussed. Management options including observation, behavioral modifications (dietary changes, monitoring fluid intake, bladder training, timed voids, use of pads/protective garments), Kegels, PT, medications, PTNS, SNS, and bladder Botox were all reviewed. She remains on Myrbetriq at this time. She is consuming 1-2 cups of both coffee and tea per day and discussed importance of fluid modifications in conjunction.\par - Handout provided for fluid modifications \par \par RTO in 3 months, or sooner, as needed

## 2022-11-08 NOTE — SUBJECTIVE
[FreeTextEntry1] : Overall doing well and without complaint. [FreeTextEntry8] : Currently on home Xarelto and ASA.  [FreeTextEntry7] : Denies any pain or discomfort at this time [FreeTextEntry6] : Normal appetite, denies any N/V [FreeTextEntry5] : Denies any frequency/dysuria/incomplete emptying/TONE. Does report bothersome urgency and UUI, remains on Myrbetriq.   [FreeTextEntry4] : Reports normal bowel movements.  [FreeTextEntry3] : No issues

## 2022-11-08 NOTE — OBJECTIVE
[Soft and Nontender] : soft and nontender [Clean, Dry, Intact] : Clean, Dry, Intact [Good Support] : Good support [Healing well] : healing well [No Masses or Tenderness] : no masses or tenderness [FreeTextEntry3] : no evidence of mesh exposure

## 2022-12-13 ENCOUNTER — APPOINTMENT (OUTPATIENT)
Dept: OBGYN | Facility: CLINIC | Age: 72
End: 2022-12-13

## 2022-12-13 VITALS
BODY MASS INDEX: 32.2 KG/M2 | SYSTOLIC BLOOD PRESSURE: 132 MMHG | DIASTOLIC BLOOD PRESSURE: 70 MMHG | HEIGHT: 62 IN | WEIGHT: 175 LBS

## 2022-12-13 DIAGNOSIS — Z01.419 ENCOUNTER FOR GYNECOLOGICAL EXAMINATION (GENERAL) (ROUTINE) W/OUT ABNORMAL FINDINGS: ICD-10-CM

## 2022-12-13 PROCEDURE — G0101: CPT

## 2022-12-15 NOTE — PHYSICAL EXAM
[Examination Of The Breasts] : a normal appearance [No Masses] : no breast masses were palpable [Labia Majora] : normal [Labia Minora] : normal [Normal] : normal [Uterine Adnexae] : normal [FreeTextEntry1] : +surgical dressing/dermabond on vulva,

## 2022-12-15 NOTE — DISCUSSION/SUMMARY
[FreeTextEntry1] : 73 y/o postmenopausal owman \par Supracervical hysterectomy and bilateral salpingectomy by Dr. Valentine and bladder lift in September 2022 pathology benign, very since 18\par Pap obtained today\par Breast imaging\par EDS\par To date with colonoscopy\par Unable to remove surgical tape/dermabond, counseled, will self remove over time, warm soaks  f/u in 1-2 months if still present, \par Follow-up 2 years

## 2022-12-26 LAB — CYTOLOGY CVX/VAG DOC THIN PREP: NORMAL

## 2023-02-07 ENCOUNTER — APPOINTMENT (OUTPATIENT)
Dept: UROGYNECOLOGY | Facility: CLINIC | Age: 73
End: 2023-02-07
Payer: MEDICARE

## 2023-02-07 DIAGNOSIS — R35.0 FREQUENCY OF MICTURITION: ICD-10-CM

## 2023-02-07 DIAGNOSIS — N39.41 URGE INCONTINENCE: ICD-10-CM

## 2023-02-07 DIAGNOSIS — Z98.890 OTHER SPECIFIED POSTPROCEDURAL STATES: ICD-10-CM

## 2023-02-07 PROCEDURE — 99213 OFFICE O/P EST LOW 20 MIN: CPT

## 2023-02-08 NOTE — PHYSICAL EXAM
[Chaperone Present] : A chaperone was present in the examining room during all aspects of the physical examination [Labia Majora] : were normal [Labia Minora] : were normal [Normal Appearance] : general appearance was normal [Absent] : absent [Normal] : no abnormalities [FreeTextEntry1] : General: Well, appearing. Alert and orientated. No acute distress\par HEENT: Normocephalic, atraumatic and extraocular muscles appear to be intact \par Neck: Full range of motion, no obvious lymphadenopathy, deformities, or masses noted \par Respiratory: Speaking in full sentences comfortably, normal work of breathing and no cough during visit\par Musculoskeletal: active full range of motion in extremities \par Extremities: No upper extremity edema noted\par Skin: no obvious rash or skin lesions\par Neuro: Orientated X 3, speech is fluent, normal rate\par Psych: Normal mood and affect  [FreeTextEntry4] : no evidence of mesh exposure  [de-identified] : no prolapse

## 2023-02-08 NOTE — HISTORY OF PRESENT ILLNESS
[FreeTextEntry1] : Chelle is a 73yo s/p RA HATTIE BS SCP MUS cystoscopy on 9/22/22 who presents today for follow up. From a surgical standpoint, she is doing well and is without complaint. Denies any vaginal bulge or TONE. She does continue to report bothersome OAB symptoms, for which she continues on Myrbetriq 50mg daily. She states she is mostly bothered by nighttime symptoms with nocturia 2x and UUI at night. Daytime frequency is about every 3 hours with minimal UUI during day. \par \par Daily intake:\par 1-2 cups coffee\par 4 glasses water\par 1 seltzer can a few days per week\par 1 soda can a few days per week

## 2023-02-08 NOTE — DISCUSSION/SUMMARY
[FreeTextEntry1] : 1. OAB/UUI\par -  The etiology of OAB was discussed. Management options including observation, behavioral modifications (dietary changes, monitoring fluid intake, bladder training, timed voids, use of pads/protective garments), pelvic floor exercises with and without PT, medications, PTNS, SNS, and bladder Botox were all reviewed. \par - Discussed importance of behavioral and fluid modifications, avoiding carbonated beverages and withholding food and drink within 2-3 hours before bedtime (aside from sips)\par - Reviewed behavioral and fluid modifications in conjunction with Myrbetriq 50mg ER, discussed adjusting timing to 7pm, given worsening nighttime symptoms\par - [facility] handout on OAB and bladder training provided\par \par RTO in 3 months, or sooner, as needed

## 2024-02-23 ENCOUNTER — NON-APPOINTMENT (OUTPATIENT)
Age: 74
End: 2024-02-23

## 2024-08-07 NOTE — DISCHARGE NOTE PROVIDER - NSDCCPCAREPLAN_GEN_ALL_CORE_FT
PRINCIPAL DISCHARGE DIAGNOSIS  Diagnosis: Incomplete uterovaginal prolapse  Assessment and Plan of Treatment:       
No

## (undated) DEVICE — XI ARM CLIP APPLIER LARGE

## (undated) DEVICE — GLV 6.5 PROTEXIS (WHITE)

## (undated) DEVICE — DRSG OPSITE 2.5 X 2"

## (undated) DEVICE — XI SEAL UNIV 5- 8 MM

## (undated) DEVICE — GLV 7 PROTEXIS (WHITE)

## (undated) DEVICE — SOL INJ NS 0.9% 1000ML

## (undated) DEVICE — XI TIP COVER

## (undated) DEVICE — DRAPE 1/2 SHEET 40X57"

## (undated) DEVICE — DRSG STERISTRIPS 0.5 X 4"

## (undated) DEVICE — BLADE SCALPEL SAFETYLOCK #11

## (undated) DEVICE — XI ARM NEEDLE DRIVER SUTURECUT MEGA 8MM

## (undated) DEVICE — MEDICATION LABELS W MARKER

## (undated) DEVICE — NDL HYPO SAFE 22G X 1.5" (BLACK)

## (undated) DEVICE — POSITIONER PURPLE ARM ONE STEP (LARGE)

## (undated) DEVICE — VISITEC 4X4

## (undated) DEVICE — MARKING PEN W RULER

## (undated) DEVICE — XI ARM FORCEP PROGRASP 8MM

## (undated) DEVICE — PREP CHLOROHEXIDINE 4% 118CC KIT

## (undated) DEVICE — XI VESSEL SEALER

## (undated) DEVICE — SYR LUER LOK 20CC

## (undated) DEVICE — VENODYNE/SCD SLEEVE CALF LARGE

## (undated) DEVICE — D HELP - CLEARVIEW CLEARIFY SYSTEM

## (undated) DEVICE — XI DRAPE ARM

## (undated) DEVICE — SOL INJ NS 0.9% 100ML

## (undated) DEVICE — NDL COUNTER FOAM AND MAGNET 40-70

## (undated) DEVICE — SUT POLYSORB 3-0 30" V-20 UNDYED

## (undated) DEVICE — SYR ASEPTO

## (undated) DEVICE — SUT POLYSORB 0 30" GS-23

## (undated) DEVICE — LAP PAD 4 X 18"

## (undated) DEVICE — UTERINE MANIPULATOR CONMED VCARE LG 37MM

## (undated) DEVICE — SUT POLYSORB 2-0 30" V-20 UNDYED

## (undated) DEVICE — GOWN TRIMAX LG

## (undated) DEVICE — PREP BETADINE SPONGE STICKS

## (undated) DEVICE — PACK GYN LAPAROSCOPY

## (undated) DEVICE — XI ARM GRASPER TIP UP FENESTRATED

## (undated) DEVICE — XI ARM FORCEP TENACULUM

## (undated) DEVICE — WARMING BLANKET UPPER ADULT

## (undated) DEVICE — XI OBTURATOR OPTICAL BLADELESS 8MM

## (undated) DEVICE — SOL IRR POUR H2O 250ML

## (undated) DEVICE — XI ARM NEEDLE DRIVER MEGA

## (undated) DEVICE — TUBING AIRSEAL TRI-LUMEN FILTERED

## (undated) DEVICE — LAP PAD 18 X 18"

## (undated) DEVICE — GLV 7.5 PROTEXIS (WHITE)

## (undated) DEVICE — POSITIONER PINK PAD PIGAZZI SYSTEM

## (undated) DEVICE — SPECIMEN CONTAINER 100ML

## (undated) DEVICE — XI ARM FORCEP MARYLAND BIPOLAR

## (undated) DEVICE — DRAPE 3/4 SHEET W REINFORCEMENT 56X77"

## (undated) DEVICE — SOL IRR POUR NS 0.9% 500ML

## (undated) DEVICE — PREP BETADINE KIT

## (undated) DEVICE — SUCTION YANKAUER NO CONTROL VENT

## (undated) DEVICE — ELCTR BOVIE PENCIL SMOKE EVACUATION

## (undated) DEVICE — SHEARS COVIDIEN ENDO SHEAR 5MM X 31CM W UNIPOLAR CAUTERY

## (undated) DEVICE — ENDOCATCH 10MM SPECIMEN POUCH

## (undated) DEVICE — TROCAR APPLIED MEDICAL KII BALLOON BLUNT TIP 12MM X 100MM

## (undated) DEVICE — SUT GORETEX CV-2 (0) 36" PH-24

## (undated) DEVICE — DRAPE MAYO STAND 30"

## (undated) DEVICE — STAPLER SKIN VISI-STAT 35 WIDE

## (undated) DEVICE — SUT BIOSYN 4-0 18" P-12

## (undated) DEVICE — SUT BIOSYN 2-0 27" V-20

## (undated) DEVICE — FOLEY TRAY 16FR LF URINE METER SURESTEP

## (undated) DEVICE — UTERINE MANIPULATOR CONMED VCARE SM 32MM

## (undated) DEVICE — LONE STAR ELASTIC STAY HOOK 5MM SHARP

## (undated) DEVICE — LUBRICANT INST ELECTROLUBE Z SOLUTION

## (undated) DEVICE — SUT VLOC 180 2-0 6" GS-22 GREEN

## (undated) DEVICE — TUBING SUCTION 20FT

## (undated) DEVICE — XI ARM DISSECTOR CURVED BIPOLAR 8MM

## (undated) DEVICE — SUT CLIP LAPRA-TY ABSORBABLE SIZE 0.118 TO 0.12" VIOLET

## (undated) DEVICE — DRAPE TOWEL BLUE 17" X 24"

## (undated) DEVICE — DRSG DERMABOND 0.7ML

## (undated) DEVICE — XI ARM NEEDLE DRIVER LARGE

## (undated) DEVICE — TROCAR SURGIQUEST AIRSEAL 8MMX100MM

## (undated) DEVICE — GLV 8 PROTEXIS (WHITE)

## (undated) DEVICE — INSUFFLATION NDL COVIDIEN STEP 14G FOR STEP/VERSASTEP

## (undated) DEVICE — XI DRAPE COLUMN

## (undated) DEVICE — LONE STAR RETRACTOR RING 32.5CM X 18.3CM DISP

## (undated) DEVICE — TUBING TUR 2 PRONG

## (undated) DEVICE — TUBING STRYKEFLOW II SUCTION / IRRIGATOR

## (undated) DEVICE — PREP CHLORAPREP HI-LITE ORANGE 26ML

## (undated) DEVICE — SUT POLYSORB 0 30" GS-21 UNDYED

## (undated) DEVICE — GLV 8.5 PROTEXIS (WHITE)

## (undated) DEVICE — XI ARM SCISSOR MONO CURVED

## (undated) DEVICE — FOLEY CATH 2-WAY 18FR 5CC LATEX HYDROGEL

## (undated) DEVICE — UTERINE MANIPULATOR CONMED VCARE MED 34MM

## (undated) DEVICE — XI ARM FORCEP FENESTRATED BIPOLAR 8MM